# Patient Record
Sex: FEMALE | Race: WHITE | Employment: OTHER | URBAN - METROPOLITAN AREA
[De-identification: names, ages, dates, MRNs, and addresses within clinical notes are randomized per-mention and may not be internally consistent; named-entity substitution may affect disease eponyms.]

---

## 2019-03-04 ENCOUNTER — PREP FOR PROCEDURE (OUTPATIENT)
Dept: SURGERY | Age: 74
End: 2019-03-04

## 2019-03-06 RX ORDER — ALBUTEROL SULFATE 90 UG/1
2 AEROSOL, METERED RESPIRATORY (INHALATION) EVERY 4 HOURS PRN
COMMUNITY
End: 2020-09-30

## 2019-03-08 ENCOUNTER — ANESTHESIA EVENT (OUTPATIENT)
Dept: ENDOSCOPY | Age: 74
End: 2019-03-08
Payer: MEDICARE

## 2019-03-08 ENCOUNTER — ANESTHESIA (OUTPATIENT)
Dept: ENDOSCOPY | Age: 74
End: 2019-03-08
Payer: MEDICARE

## 2019-03-08 ENCOUNTER — HOSPITAL ENCOUNTER (OUTPATIENT)
Age: 74
Setting detail: OUTPATIENT SURGERY
Discharge: HOME | End: 2019-03-08
Attending: SURGERY | Admitting: SURGERY
Payer: MEDICARE

## 2019-03-08 VITALS
DIASTOLIC BLOOD PRESSURE: 50 MMHG | RESPIRATION RATE: 16 BRPM | SYSTOLIC BLOOD PRESSURE: 86 MMHG | OXYGEN SATURATION: 98 %

## 2019-03-08 VITALS
WEIGHT: 177 LBS | RESPIRATION RATE: 16 BRPM | TEMPERATURE: 99.2 F | DIASTOLIC BLOOD PRESSURE: 71 MMHG | BODY MASS INDEX: 30.22 KG/M2 | HEART RATE: 64 BPM | OXYGEN SATURATION: 98 % | HEIGHT: 64 IN | SYSTOLIC BLOOD PRESSURE: 150 MMHG

## 2019-03-08 PROCEDURE — 2580000003 HC RX 258: Performed by: SURGERY

## 2019-03-08 PROCEDURE — 3700000001 HC ADD 15 MINUTES (ANESTHESIA): Performed by: SURGERY

## 2019-03-08 PROCEDURE — 6360000002 HC RX W HCPCS

## 2019-03-08 PROCEDURE — 88342 IMHCHEM/IMCYTCHM 1ST ANTB: CPT

## 2019-03-08 PROCEDURE — 7100000011 HC PHASE II RECOVERY - ADDTL 15 MIN: Performed by: SURGERY

## 2019-03-08 PROCEDURE — 7100000010 HC PHASE II RECOVERY - FIRST 15 MIN: Performed by: SURGERY

## 2019-03-08 PROCEDURE — 2709999900 HC NON-CHARGEABLE SUPPLY: Performed by: SURGERY

## 2019-03-08 PROCEDURE — 3700000000 HC ANESTHESIA ATTENDED CARE: Performed by: SURGERY

## 2019-03-08 PROCEDURE — 2580000003 HC RX 258

## 2019-03-08 PROCEDURE — 3609012400 HC EGD TRANSORAL BIOPSY SINGLE/MULTIPLE: Performed by: SURGERY

## 2019-03-08 PROCEDURE — 88305 TISSUE EXAM BY PATHOLOGIST: CPT

## 2019-03-08 PROCEDURE — 3609009500 HC COLONOSCOPY DIAGNOSTIC OR SCREENING: Performed by: SURGERY

## 2019-03-08 RX ORDER — 0.9 % SODIUM CHLORIDE 0.9 %
10 VIAL (ML) INJECTION EVERY 12 HOURS SCHEDULED
Status: DISCONTINUED | OUTPATIENT
Start: 2019-03-08 | End: 2019-03-08 | Stop reason: HOSPADM

## 2019-03-08 RX ORDER — 0.9 % SODIUM CHLORIDE 0.9 %
10 VIAL (ML) INJECTION PRN
Status: DISCONTINUED | OUTPATIENT
Start: 2019-03-08 | End: 2019-03-08 | Stop reason: HOSPADM

## 2019-03-08 RX ORDER — SODIUM CHLORIDE 9 MG/ML
INJECTION, SOLUTION INTRAVENOUS CONTINUOUS PRN
Status: DISCONTINUED | OUTPATIENT
Start: 2019-03-08 | End: 2019-03-08 | Stop reason: SDUPTHER

## 2019-03-08 RX ORDER — PROPOFOL 10 MG/ML
INJECTION, EMULSION INTRAVENOUS PRN
Status: DISCONTINUED | OUTPATIENT
Start: 2019-03-08 | End: 2019-03-08 | Stop reason: SDUPTHER

## 2019-03-08 RX ADMIN — Medication 10 ML: at 11:21

## 2019-03-08 RX ADMIN — SODIUM CHLORIDE: 9 INJECTION, SOLUTION INTRAVENOUS at 12:10

## 2019-03-08 RX ADMIN — PROPOFOL 290 MG: 10 INJECTION, EMULSION INTRAVENOUS at 12:12

## 2019-03-08 ASSESSMENT — PAIN SCALES - GENERAL
PAINLEVEL_OUTOF10: 0

## 2019-03-08 ASSESSMENT — PAIN - FUNCTIONAL ASSESSMENT: PAIN_FUNCTIONAL_ASSESSMENT: 0-10

## 2019-04-25 ENCOUNTER — HOSPITAL ENCOUNTER (OUTPATIENT)
Dept: GENERAL RADIOLOGY | Age: 74
Discharge: HOME | End: 2019-04-27
Payer: MEDICARE

## 2019-04-25 DIAGNOSIS — Z12.39 BREAST CANCER SCREENING: ICD-10-CM

## 2019-04-25 PROCEDURE — 77063 BREAST TOMOSYNTHESIS BI: CPT

## 2019-05-21 ENCOUNTER — OFFICE VISIT (OUTPATIENT)
Dept: PRIMARY CARE CLINIC | Age: 74
End: 2019-05-21
Payer: MEDICARE

## 2019-05-21 VITALS
DIASTOLIC BLOOD PRESSURE: 82 MMHG | BODY MASS INDEX: 30.22 KG/M2 | HEART RATE: 77 BPM | HEIGHT: 64 IN | WEIGHT: 177 LBS | OXYGEN SATURATION: 95 % | TEMPERATURE: 98.9 F | SYSTOLIC BLOOD PRESSURE: 120 MMHG

## 2019-05-21 DIAGNOSIS — M79.642 HAND PAIN, LEFT: ICD-10-CM

## 2019-05-21 DIAGNOSIS — E78.2 MIXED HYPERLIPIDEMIA: ICD-10-CM

## 2019-05-21 DIAGNOSIS — C50.412 MALIGNANT NEOPLASM OF UPPER-OUTER QUADRANT OF LEFT BREAST IN FEMALE, ESTROGEN RECEPTOR NEGATIVE (HCC): ICD-10-CM

## 2019-05-21 DIAGNOSIS — Z17.1 MALIGNANT NEOPLASM OF UPPER-OUTER QUADRANT OF LEFT BREAST IN FEMALE, ESTROGEN RECEPTOR NEGATIVE (HCC): ICD-10-CM

## 2019-05-21 DIAGNOSIS — K21.9 GASTROESOPHAGEAL REFLUX DISEASE WITHOUT ESOPHAGITIS: ICD-10-CM

## 2019-05-21 DIAGNOSIS — D50.9 IRON DEFICIENCY ANEMIA, UNSPECIFIED IRON DEFICIENCY ANEMIA TYPE: Primary | ICD-10-CM

## 2019-05-21 PROCEDURE — 99214 OFFICE O/P EST MOD 30 MIN: CPT | Performed by: FAMILY MEDICINE

## 2019-05-21 RX ORDER — FAMOTIDINE 40 MG/1
40 TABLET, FILM COATED ORAL DAILY
Qty: 90 TABLET | Refills: 2 | Status: ON HOLD | OUTPATIENT
Start: 2019-05-21 | End: 2019-07-04 | Stop reason: HOSPADM

## 2019-05-21 ASSESSMENT — PATIENT HEALTH QUESTIONNAIRE - PHQ9
9. THOUGHTS THAT YOU WOULD BE BETTER OFF DEAD, OR OF HURTING YOURSELF: 0
2. FEELING DOWN, DEPRESSED OR HOPELESS: 2
8. MOVING OR SPEAKING SO SLOWLY THAT OTHER PEOPLE COULD HAVE NOTICED. OR THE OPPOSITE, BEING SO FIGETY OR RESTLESS THAT YOU HAVE BEEN MOVING AROUND A LOT MORE THAN USUAL: 0
SUM OF ALL RESPONSES TO PHQ9 QUESTIONS 1 & 2: 4
4. FEELING TIRED OR HAVING LITTLE ENERGY: 2
10. IF YOU CHECKED OFF ANY PROBLEMS, HOW DIFFICULT HAVE THESE PROBLEMS MADE IT FOR YOU TO DO YOUR WORK, TAKE CARE OF THINGS AT HOME, OR GET ALONG WITH OTHER PEOPLE: 0
SUM OF ALL RESPONSES TO PHQ QUESTIONS 1-9: 7
3. TROUBLE FALLING OR STAYING ASLEEP: 0
6. FEELING BAD ABOUT YOURSELF - OR THAT YOU ARE A FAILURE OR HAVE LET YOURSELF OR YOUR FAMILY DOWN: 0
1. LITTLE INTEREST OR PLEASURE IN DOING THINGS: 2
5. POOR APPETITE OR OVEREATING: 1
SUM OF ALL RESPONSES TO PHQ QUESTIONS 1-9: 7
7. TROUBLE CONCENTRATING ON THINGS, SUCH AS READING THE NEWSPAPER OR WATCHING TELEVISION: 0

## 2019-05-21 ASSESSMENT — ENCOUNTER SYMPTOMS
EYES NEGATIVE: 1
ALLERGIC/IMMUNOLOGIC NEGATIVE: 1
GASTROINTESTINAL NEGATIVE: 1
RESPIRATORY NEGATIVE: 1

## 2019-05-21 NOTE — PROGRESS NOTES
19     Toney Simmonds    : 1945 Sex: female   Age: 68 y.o. Chief Complaint   Patient presents with    Anxiety    Gastroesophageal Reflux    Anemia     getting Iron infusions    Arm Pain     left arm and into shoulder area at times       Prior to Admission medications    Medication Sig Start Date End Date Taking? Authorizing Provider   famotidine (PEPCID) 40 MG tablet Take 1 tablet by mouth daily 19  Yes Manoj Aguilar,    albuterol sulfate HFA (PROAIR HFA) 108 (90 Base) MCG/ACT inhaler Inhale 2 puffs into the lungs every 4 hours as needed for Wheezing   Yes Historical Provider, MD   Glucosamine-Chondroitin (GLUCOSAMINE CHONDR COMPLEX PO) Take 2,000 mg by mouth daily. Yes Historical Provider, MD   Omega-3 Fatty Acids (FISH OIL) 1000 MG CAPS Take 520 mg by mouth daily    Yes Historical Provider, MD   therapeutic multivitamin-minerals (THERAGRAN-M) tablet Take 1 tablet by mouth daily. Yes Historical Provider, MD   Calcium Carbonate-Vitamin D (CALCIUM + D) 600-200 MG-UNIT TABS Take  by mouth 2 times daily. Yes Historical Provider, MD          HPI: Patient seen today in medical follow-up reflux disease stable when necessary use of famotidine. Malignant neoplasm of the left breast back in  stable. T1 N0 stable disease. Chronic anemia follows with oncology for IV iron replacement and stable. Hyperlipidemia we'll follow-up on labs currently diet and exercise. Left hand pain acute in onset no significant trauma. Mild sprain at home. Will check x-ray study. Review of Systems   Constitutional: Negative. HENT: Negative. Eyes: Negative. Respiratory: Negative. Gastrointestinal: Negative. Endocrine: Negative. Genitourinary: Negative. Musculoskeletal: Negative. Left hand pain mild strain and sprain. Minimal swelling   Skin: Negative. Allergic/Immunologic: Negative. Neurological: Negative. Hematological: Negative.     Psychiatric/Behavioral: Negative. Current Outpatient Medications:     famotidine (PEPCID) 40 MG tablet, Take 1 tablet by mouth daily, Disp: 90 tablet, Rfl: 2    albuterol sulfate HFA (PROAIR HFA) 108 (90 Base) MCG/ACT inhaler, Inhale 2 puffs into the lungs every 4 hours as needed for Wheezing, Disp: , Rfl:     Glucosamine-Chondroitin (GLUCOSAMINE CHONDR COMPLEX PO), Take 2,000 mg by mouth daily. , Disp: , Rfl:     Omega-3 Fatty Acids (FISH OIL) 1000 MG CAPS, Take 520 mg by mouth daily , Disp: , Rfl:     therapeutic multivitamin-minerals (THERAGRAN-M) tablet, Take 1 tablet by mouth daily. , Disp: , Rfl:     Calcium Carbonate-Vitamin D (CALCIUM + D) 600-200 MG-UNIT TABS, Take  by mouth 2 times daily. , Disp: , Rfl:     No Known Allergies    Social History     Tobacco Use    Smoking status: Former Smoker     Packs/day: 1.00     Years: 10.00     Pack years: 10.00     Last attempt to quit: 2002     Years since quittin.3    Smokeless tobacco: Never Used   Substance Use Topics    Alcohol use: Not on file    Drug use: No      Past Surgical History:   Procedure Laterality Date   Lori Lands LUMPECTOMY      Dr. Matthews [facility]    COLONOSCOPY N/A 3/8/2019    COLONOSCOPY DIAGNOSTIC performed by Bj Roe MD at [address] [facility]      Dr. Barrios Meagan N/A 3/8/2019    EGD BIOPSY performed by Bj Roe MD at [facility] History   Problem Relation Age of Onset    Cancer Mother         Pancreatic and liver    Diabetes Father     Stroke Father     Lupus Sister     Breast Cancer Sister      Past Medical History:   Diagnosis Date    Anemia     Anemia     Asthma     COPD [facility]chronic obstructive pulmonary disease) ([facility] [address]     Gastroesophageal reflux     [place] (hard of hearing)     HX: breast cancer     Right breast    Iron deficiency Vitals:    05/21/19 1418   BP: 120/82   Pulse: 77   Temp: 98.9 °F (37.2 °C)   TempSrc: Temporal   SpO2: 95%   Weight: 177 lb (80.3 kg)   Height: 5' 4\" (1.626 m)     BP Readings from Last 3 Encounters:   05/21/19 120/82   03/08/19 (!) 150/71   03/08/19 (!) 86/50        Physical Exam   Constitutional: She is oriented to person, place, and time. She appears well-developed and well-nourished. HENT:   Head: Normocephalic. Right Ear: External ear normal.   Left Ear: External ear normal.   Nose: Nose normal.   Mouth/Throat: Oropharynx is clear and moist.   Eyes: Pupils are equal, round, and reactive to light. Conjunctivae and EOM are normal.   Neck: Normal range of motion. Neck supple. Cardiovascular: Normal rate and intact distal pulses. Pulmonary/Chest: Breath sounds normal.   Abdominal: Soft. Bowel sounds are normal.   Musculoskeletal: Normal range of motion. Strain sprain left hand. Minimal swelling over the index finger. X-ray to be completed. Recommended rest ice and ibuprofen when necessary   Neurological: She is alert and oriented to person, place, and time. Skin: Skin is warm and dry. Psychiatric: She has a normal mood and affect. Her behavior is normal.   Nursing note and vitals reviewed. exam findings essentially stable as noted. X-ray today. Labs with next visit. Three-month follow-up. Plan Per Assessment:  Shirin Ramirez was seen today for anxiety, gastroesophageal reflux, anemia and arm pain. Diagnoses and all orders for this visit:    Iron deficiency anemia, unspecified iron deficiency anemia type    Gastroesophageal reflux disease without esophagitis    Malignant neoplasm of upper-outer quadrant of left breast in female, estrogen receptor negative (HCC)    Hand pain, left  -     XR HAND LEFT (2 VIEWS); Future    Other orders  -     famotidine (PEPCID) 40 MG tablet; Take 1 tablet by mouth daily            No follow-ups on file.       Melvin Grissom,     Note was generated with the assistance of voice recognition software. Document was reviewed however may contain grammatical errors.

## 2019-05-23 ENCOUNTER — TELEPHONE (OUTPATIENT)
Dept: PODIATRY | Age: 74
End: 2019-05-23

## 2019-07-02 ENCOUNTER — APPOINTMENT (OUTPATIENT)
Dept: GENERAL RADIOLOGY | Age: 74
DRG: 378 | End: 2019-07-02
Payer: MEDICARE

## 2019-07-02 ENCOUNTER — HOSPITAL ENCOUNTER (INPATIENT)
Age: 74
LOS: 2 days | Discharge: HOME | DRG: 378 | End: 2019-07-04
Attending: EMERGENCY MEDICINE | Admitting: INTERNAL MEDICINE
Payer: MEDICARE

## 2019-07-02 DIAGNOSIS — Z85.3 HISTORY OF BREAST CANCER: ICD-10-CM

## 2019-07-02 DIAGNOSIS — K92.2 UGIB (UPPER GASTROINTESTINAL BLEED): Primary | ICD-10-CM

## 2019-07-02 DIAGNOSIS — D62 ACUTE BLOOD LOSS ANEMIA: ICD-10-CM

## 2019-07-02 PROBLEM — M79.642 HAND PAIN, LEFT: Status: RESOLVED | Noted: 2019-05-21 | Resolved: 2019-07-02

## 2019-07-02 LAB
ABO/RH: NORMAL
ALBUMIN SERPL-MCNC: 4.1 G/DL (ref 3.5–5.2)
ALP BLD-CCNC: 62 U/L (ref 35–104)
ALT SERPL-CCNC: 8 U/L (ref 0–32)
ANION GAP SERPL CALCULATED.3IONS-SCNC: 8 MMOL/L (ref 7–16)
ANISOCYTOSIS: ABNORMAL
ANTIBODY SCREEN: NORMAL
AST SERPL-CCNC: 11 U/L (ref 0–31)
BASOPHILS ABSOLUTE: 0.01 E9/L (ref 0–0.2)
BASOPHILS RELATIVE PERCENT: 0.3 % (ref 0–2)
BILIRUB SERPL-MCNC: 0.2 MG/DL (ref 0–1.2)
BUN BLDV-MCNC: 11 MG/DL (ref 8–23)
CALCIUM SERPL-MCNC: 8.9 MG/DL (ref 8.6–10.2)
CHLORIDE BLD-SCNC: 103 MMOL/L (ref 98–107)
CO2: 28 MMOL/L (ref 22–29)
CREAT SERPL-MCNC: 0.7 MG/DL (ref 0.5–1)
EOSINOPHILS ABSOLUTE: 0.02 E9/L (ref 0.05–0.5)
EOSINOPHILS RELATIVE PERCENT: 0.6 % (ref 0–6)
GFR AFRICAN AMERICAN: >60
GFR NON-AFRICAN AMERICAN: >60 ML/MIN/1.73
GLUCOSE BLD-MCNC: 97 MG/DL (ref 74–99)
HCT VFR BLD CALC: 19.9 % (ref 34–48)
HEMOGLOBIN: 5.7 G/DL (ref 11.5–15.5)
HYPOCHROMIA: ABNORMAL
IMMATURE GRANULOCYTES #: 0.02 E9/L
IMMATURE GRANULOCYTES %: 0.6 % (ref 0–5)
LYMPHOCYTES ABSOLUTE: 0.6 E9/L (ref 1.5–4)
LYMPHOCYTES RELATIVE PERCENT: 18.1 % (ref 20–42)
MCH RBC QN AUTO: 22.3 PG (ref 26–35)
MCHC RBC AUTO-ENTMCNC: 28.6 % (ref 32–34.5)
MCV RBC AUTO: 77.7 FL (ref 80–99.9)
MONOCYTES ABSOLUTE: 0.34 E9/L (ref 0.1–0.95)
MONOCYTES RELATIVE PERCENT: 10.2 % (ref 2–12)
NEUTROPHILS ABSOLUTE: 2.33 E9/L (ref 1.8–7.3)
NEUTROPHILS RELATIVE PERCENT: 70.2 % (ref 43–80)
OVALOCYTES: ABNORMAL
PDW BLD-RTO: 15.4 FL (ref 11.5–15)
PLATELET # BLD: 287 E9/L (ref 130–450)
PMV BLD AUTO: 9.3 FL (ref 7–12)
POIKILOCYTES: ABNORMAL
POLYCHROMASIA: ABNORMAL
POTASSIUM SERPL-SCNC: 4 MMOL/L (ref 3.5–5)
RBC # BLD: 2.56 E12/L (ref 3.5–5.5)
SCHISTOCYTES: ABNORMAL
SODIUM BLD-SCNC: 139 MMOL/L (ref 132–146)
TOTAL PROTEIN: 6.5 G/DL (ref 6.4–8.3)
WBC # BLD: 3.3 E9/L (ref 4.5–11.5)

## 2019-07-02 PROCEDURE — 86900 BLOOD TYPING SEROLOGIC ABO: CPT

## 2019-07-02 PROCEDURE — C9113 INJ PANTOPRAZOLE SODIUM, VIA: HCPCS | Performed by: EMERGENCY MEDICINE

## 2019-07-02 PROCEDURE — 80053 COMPREHEN METABOLIC PANEL: CPT

## 2019-07-02 PROCEDURE — 99285 EMERGENCY DEPT VISIT HI MDM: CPT

## 2019-07-02 PROCEDURE — 71045 X-RAY EXAM CHEST 1 VIEW: CPT

## 2019-07-02 PROCEDURE — 6360000002 HC RX W HCPCS: Performed by: EMERGENCY MEDICINE

## 2019-07-02 PROCEDURE — 96374 THER/PROPH/DIAG INJ IV PUSH: CPT

## 2019-07-02 PROCEDURE — 2060000000 HC ICU INTERMEDIATE R&B

## 2019-07-02 PROCEDURE — 85025 COMPLETE CBC W/AUTO DIFF WBC: CPT

## 2019-07-02 PROCEDURE — 93005 ELECTROCARDIOGRAM TRACING: CPT | Performed by: EMERGENCY MEDICINE

## 2019-07-02 PROCEDURE — 96375 TX/PRO/DX INJ NEW DRUG ADDON: CPT

## 2019-07-02 PROCEDURE — 86923 COMPATIBILITY TEST ELECTRIC: CPT

## 2019-07-02 PROCEDURE — 86850 RBC ANTIBODY SCREEN: CPT

## 2019-07-02 PROCEDURE — G0378 HOSPITAL OBSERVATION PER HR: HCPCS

## 2019-07-02 PROCEDURE — 36415 COLL VENOUS BLD VENIPUNCTURE: CPT

## 2019-07-02 PROCEDURE — 86901 BLOOD TYPING SEROLOGIC RH(D): CPT

## 2019-07-02 PROCEDURE — P9016 RBC LEUKOCYTES REDUCED: HCPCS

## 2019-07-02 PROCEDURE — 2580000003 HC RX 258: Performed by: INTERNAL MEDICINE

## 2019-07-02 PROCEDURE — 36430 TRANSFUSION BLD/BLD COMPNT: CPT

## 2019-07-02 RX ORDER — SODIUM CHLORIDE 0.9 % (FLUSH) 0.9 %
10 SYRINGE (ML) INJECTION PRN
Status: DISCONTINUED | OUTPATIENT
Start: 2019-07-02 | End: 2019-07-02 | Stop reason: SDUPTHER

## 2019-07-02 RX ORDER — 0.9 % SODIUM CHLORIDE 0.9 %
10 VIAL (ML) INJECTION ONCE
Status: COMPLETED | OUTPATIENT
Start: 2019-07-02 | End: 2019-07-02

## 2019-07-02 RX ORDER — ACETAMINOPHEN 325 MG/1
650 TABLET ORAL EVERY 4 HOURS PRN
Status: DISCONTINUED | OUTPATIENT
Start: 2019-07-02 | End: 2019-07-04 | Stop reason: HOSPADM

## 2019-07-02 RX ORDER — ONDANSETRON 2 MG/ML
4 INJECTION INTRAMUSCULAR; INTRAVENOUS EVERY 6 HOURS PRN
Status: DISCONTINUED | OUTPATIENT
Start: 2019-07-02 | End: 2019-07-04 | Stop reason: HOSPADM

## 2019-07-02 RX ORDER — ALBUTEROL SULFATE 90 UG/1
2 AEROSOL, METERED RESPIRATORY (INHALATION) EVERY 4 HOURS PRN
Status: DISCONTINUED | OUTPATIENT
Start: 2019-07-02 | End: 2019-07-04 | Stop reason: HOSPADM

## 2019-07-02 RX ORDER — SODIUM CHLORIDE 9 MG/ML
INJECTION, SOLUTION INTRAVENOUS CONTINUOUS
Status: ACTIVE | OUTPATIENT
Start: 2019-07-02 | End: 2019-07-03

## 2019-07-02 RX ORDER — SODIUM CHLORIDE 0.9 % (FLUSH) 0.9 %
10 SYRINGE (ML) INJECTION EVERY 12 HOURS SCHEDULED
Status: DISCONTINUED | OUTPATIENT
Start: 2019-07-02 | End: 2019-07-04 | Stop reason: HOSPADM

## 2019-07-02 RX ORDER — SODIUM CHLORIDE 0.9 % (FLUSH) 0.9 %
10 SYRINGE (ML) INJECTION PRN
Status: DISCONTINUED | OUTPATIENT
Start: 2019-07-02 | End: 2019-07-04 | Stop reason: HOSPADM

## 2019-07-02 RX ORDER — 0.9 % SODIUM CHLORIDE 0.9 %
250 INTRAVENOUS SOLUTION INTRAVENOUS ONCE
Status: COMPLETED | OUTPATIENT
Start: 2019-07-02 | End: 2019-07-03

## 2019-07-02 RX ORDER — PANTOPRAZOLE SODIUM 40 MG/10ML
40 INJECTION, POWDER, LYOPHILIZED, FOR SOLUTION INTRAVENOUS DAILY
Status: DISCONTINUED | OUTPATIENT
Start: 2019-07-02 | End: 2019-07-04

## 2019-07-02 RX ORDER — PANTOPRAZOLE SODIUM 40 MG/10ML
40 INJECTION, POWDER, LYOPHILIZED, FOR SOLUTION INTRAVENOUS ONCE
Status: COMPLETED | OUTPATIENT
Start: 2019-07-02 | End: 2019-07-02

## 2019-07-02 RX ADMIN — SODIUM CHLORIDE 250 ML: 9 INJECTION, SOLUTION INTRAVENOUS at 18:15

## 2019-07-02 RX ADMIN — SODIUM CHLORIDE 10 ML: 9 INJECTION INTRAMUSCULAR; INTRAVENOUS; SUBCUTANEOUS at 18:15

## 2019-07-02 RX ADMIN — PANTOPRAZOLE SODIUM 40 MG: 40 INJECTION, POWDER, FOR SOLUTION INTRAVENOUS at 18:30

## 2019-07-02 ASSESSMENT — ENCOUNTER SYMPTOMS
BACK PAIN: 0
DIARRHEA: 0
CONSTIPATION: 0
HEMOPTYSIS: 0
ABDOMINAL PAIN: 0
SPUTUM PRODUCTION: 0
VOMITING: 0
COUGH: 0
WHEEZING: 0
NAUSEA: 0
BLOOD IN STOOL: 0
SHORTNESS OF BREATH: 1

## 2019-07-02 ASSESSMENT — PAIN SCALES - GENERAL
PAINLEVEL_OUTOF10: 0
PAINLEVEL_OUTOF10: 0

## 2019-07-02 NOTE — ED PROVIDER NOTES
murmur heard. Pulmonary/Chest: Effort normal and breath sounds normal. No respiratory distress. She has no wheezes. She has no rales. Abdominal: Soft. Bowel sounds are normal. There is no tenderness. There is no rebound and no guarding. Genitourinary: Rectal exam shows guaiac positive stool. Musculoskeletal: She exhibits no edema. Neurological: She is alert. Skin: Skin is warm and dry. Capillary refill takes less than 2 seconds. She is not diaphoretic. Nursing note and vitals reviewed. Procedures       --------------------------------------------- PAST HISTORY ---------------------------------------------  Past Medical History:  has a past medical history of Anemia, Anemia, Asthma, COPD (chronic obstructive pulmonary disease) ([facility] [address] Gastroesophageal reflux, [place] (hard of hearing), HX: breast cancer, and Iron deficiency. Past Surgical History:  has a past surgical history that includes lymph node dissection (2010); Breast lumpectomy (2010); back surgery (1989); Hysterectomy; Colonoscopy (N/A, 3/8/2019); and Upper gastrointestinal endoscopy (N/A, 3/8/2019). Social History:  reports that she quit smoking about 17 years ago. She has a 10.00 pack-year smoking history. She has never used smokeless tobacco. She reports that she does not use drugs. Family History: family history includes Breast Cancer in her sister; Cancer in her mother; Diabetes in her father; Lupus in her sister; Stroke in her father. The patients home medications have been reviewed. Allergies: Patient has no known allergies.     -------------------------------------------------- RESULTS -------------------------------------------------    LABS:  Results for orders placed or performed during the hospital encounter of 07/02/19   CBC Auto Differential   Result Value Ref Range    WBC 3.3 (L) 4.5 - 11.5 E9/L    RBC 2.56 (L) 3.50 - 5.50 E12/L    Hemoglobin 5.7 (LL) 11.5 - 15.5 g/dL    Hematocrit 19.9 (L) 34.0 - 48.0 %    MCV

## 2019-07-03 ENCOUNTER — ANESTHESIA EVENT (OUTPATIENT)
Dept: ENDOSCOPY | Age: 74
DRG: 378 | End: 2019-07-03
Payer: MEDICARE

## 2019-07-03 ENCOUNTER — ANESTHESIA (OUTPATIENT)
Dept: ENDOSCOPY | Age: 74
DRG: 378 | End: 2019-07-03
Payer: MEDICARE

## 2019-07-03 VITALS
DIASTOLIC BLOOD PRESSURE: 65 MMHG | RESPIRATION RATE: 8 BRPM | OXYGEN SATURATION: 97 % | SYSTOLIC BLOOD PRESSURE: 144 MMHG

## 2019-07-03 LAB
ALBUMIN SERPL-MCNC: 3.5 G/DL (ref 3.5–5.2)
ALP BLD-CCNC: 62 U/L (ref 35–104)
ALT SERPL-CCNC: 9 U/L (ref 0–32)
ANION GAP SERPL CALCULATED.3IONS-SCNC: 12 MMOL/L (ref 7–16)
APTT: 26.6 SEC (ref 24.5–35.1)
AST SERPL-CCNC: 12 U/L (ref 0–31)
BASOPHILS ABSOLUTE: 0.01 E9/L (ref 0–0.2)
BASOPHILS RELATIVE PERCENT: 0.3 % (ref 0–2)
BILIRUB SERPL-MCNC: 0.4 MG/DL (ref 0–1.2)
BLOOD BANK DISPENSE STATUS: NORMAL
BLOOD BANK PRODUCT CODE: NORMAL
BPU ID: NORMAL
BUN BLDV-MCNC: 7 MG/DL (ref 8–23)
CALCIUM SERPL-MCNC: 8.8 MG/DL (ref 8.6–10.2)
CHLORIDE BLD-SCNC: 107 MMOL/L (ref 98–107)
CO2: 23 MMOL/L (ref 22–29)
CREAT SERPL-MCNC: 0.8 MG/DL (ref 0.5–1)
DESCRIPTION BLOOD BANK: NORMAL
EKG ATRIAL RATE: 80 BPM
EKG P AXIS: 22 DEGREES
EKG P-R INTERVAL: 188 MS
EKG Q-T INTERVAL: 368 MS
EKG QRS DURATION: 84 MS
EKG QTC CALCULATION (BAZETT): 424 MS
EKG R AXIS: 3 DEGREES
EKG T AXIS: 12 DEGREES
EKG VENTRICULAR RATE: 80 BPM
EOSINOPHILS ABSOLUTE: 0.07 E9/L (ref 0.05–0.5)
EOSINOPHILS RELATIVE PERCENT: 2 % (ref 0–6)
GFR AFRICAN AMERICAN: >60
GFR NON-AFRICAN AMERICAN: >60 ML/MIN/1.73
GLUCOSE BLD-MCNC: 94 MG/DL (ref 74–99)
HCT VFR BLD CALC: 23.2 % (ref 34–48)
HCT VFR BLD CALC: 24.3 % (ref 34–48)
HCT VFR BLD CALC: 24.9 % (ref 34–48)
HCT VFR BLD CALC: 26.8 % (ref 34–48)
HEMOGLOBIN: 6.8 G/DL (ref 11.5–15.5)
HEMOGLOBIN: 7.2 G/DL (ref 11.5–15.5)
HEMOGLOBIN: 7.3 G/DL (ref 11.5–15.5)
HEMOGLOBIN: 8.2 G/DL (ref 11.5–15.5)
IMMATURE GRANULOCYTES #: 0.04 E9/L
IMMATURE GRANULOCYTES %: 1.2 % (ref 0–5)
INR BLD: 1
LYMPHOCYTES ABSOLUTE: 0.82 E9/L (ref 1.5–4)
LYMPHOCYTES RELATIVE PERCENT: 23.8 % (ref 20–42)
MAGNESIUM: 2 MG/DL (ref 1.6–2.6)
MCH RBC QN AUTO: 23.1 PG (ref 26–35)
MCHC RBC AUTO-ENTMCNC: 29.3 % (ref 32–34.5)
MCV RBC AUTO: 78.6 FL (ref 80–99.9)
MONOCYTES ABSOLUTE: 0.4 E9/L (ref 0.1–0.95)
MONOCYTES RELATIVE PERCENT: 11.6 % (ref 2–12)
NEUTROPHILS ABSOLUTE: 2.11 E9/L (ref 1.8–7.3)
NEUTROPHILS RELATIVE PERCENT: 61.1 % (ref 43–80)
PDW BLD-RTO: 15.5 FL (ref 11.5–15)
PLATELET # BLD: 252 E9/L (ref 130–450)
PMV BLD AUTO: 9 FL (ref 7–12)
POTASSIUM SERPL-SCNC: 3.9 MMOL/L (ref 3.5–5)
PROTHROMBIN TIME: 12 SEC (ref 9.3–12.4)
RBC # BLD: 2.95 E12/L (ref 3.5–5.5)
SODIUM BLD-SCNC: 142 MMOL/L (ref 132–146)
TOTAL PROTEIN: 5.5 G/DL (ref 6.4–8.3)
TROPONIN: <0.01 NG/ML (ref 0–0.03)
WBC # BLD: 3.5 E9/L (ref 4.5–11.5)

## 2019-07-03 PROCEDURE — 85014 HEMATOCRIT: CPT

## 2019-07-03 PROCEDURE — 7100000001 HC PACU RECOVERY - ADDTL 15 MIN: Performed by: SURGERY

## 2019-07-03 PROCEDURE — 93010 ELECTROCARDIOGRAM REPORT: CPT | Performed by: INTERNAL MEDICINE

## 2019-07-03 PROCEDURE — P9016 RBC LEUKOCYTES REDUCED: HCPCS

## 2019-07-03 PROCEDURE — 0DJ08ZZ INSPECTION OF UPPER INTESTINAL TRACT, VIA NATURAL OR ARTIFICIAL OPENING ENDOSCOPIC: ICD-10-PCS | Performed by: HOSPITALIST

## 2019-07-03 PROCEDURE — 83735 ASSAY OF MAGNESIUM: CPT

## 2019-07-03 PROCEDURE — 6370000000 HC RX 637 (ALT 250 FOR IP): Performed by: SURGERY

## 2019-07-03 PROCEDURE — 85610 PROTHROMBIN TIME: CPT

## 2019-07-03 PROCEDURE — G0378 HOSPITAL OBSERVATION PER HR: HCPCS

## 2019-07-03 PROCEDURE — 2580000003 HC RX 258: Performed by: NURSE ANESTHETIST, CERTIFIED REGISTERED

## 2019-07-03 PROCEDURE — 85025 COMPLETE CBC W/AUTO DIFF WBC: CPT

## 2019-07-03 PROCEDURE — 7100000000 HC PACU RECOVERY - FIRST 15 MIN: Performed by: SURGERY

## 2019-07-03 PROCEDURE — 2580000003 HC RX 258: Performed by: INTERNAL MEDICINE

## 2019-07-03 PROCEDURE — 2060000000 HC ICU INTERMEDIATE R&B

## 2019-07-03 PROCEDURE — 36415 COLL VENOUS BLD VENIPUNCTURE: CPT

## 2019-07-03 PROCEDURE — 6360000002 HC RX W HCPCS: Performed by: INTERNAL MEDICINE

## 2019-07-03 PROCEDURE — 85730 THROMBOPLASTIN TIME PARTIAL: CPT

## 2019-07-03 PROCEDURE — 6360000002 HC RX W HCPCS: Performed by: NURSE ANESTHETIST, CERTIFIED REGISTERED

## 2019-07-03 PROCEDURE — 36430 TRANSFUSION BLD/BLD COMPNT: CPT

## 2019-07-03 PROCEDURE — 3700000000 HC ANESTHESIA ATTENDED CARE: Performed by: SURGERY

## 2019-07-03 PROCEDURE — C9113 INJ PANTOPRAZOLE SODIUM, VIA: HCPCS | Performed by: INTERNAL MEDICINE

## 2019-07-03 PROCEDURE — 3609017100 HC EGD: Performed by: SURGERY

## 2019-07-03 PROCEDURE — 2580000003 HC RX 258: Performed by: SURGERY

## 2019-07-03 PROCEDURE — 80053 COMPREHEN METABOLIC PANEL: CPT

## 2019-07-03 PROCEDURE — 96376 TX/PRO/DX INJ SAME DRUG ADON: CPT

## 2019-07-03 PROCEDURE — 86923 COMPATIBILITY TEST ELECTRIC: CPT

## 2019-07-03 PROCEDURE — 2709999900 HC NON-CHARGEABLE SUPPLY: Performed by: SURGERY

## 2019-07-03 PROCEDURE — 84484 ASSAY OF TROPONIN QUANT: CPT

## 2019-07-03 PROCEDURE — 85018 HEMOGLOBIN: CPT

## 2019-07-03 PROCEDURE — 3700000001 HC ADD 15 MINUTES (ANESTHESIA): Performed by: SURGERY

## 2019-07-03 RX ORDER — PROPOFOL 10 MG/ML
INJECTION, EMULSION INTRAVENOUS PRN
Status: DISCONTINUED | OUTPATIENT
Start: 2019-07-03 | End: 2019-07-03 | Stop reason: SDUPTHER

## 2019-07-03 RX ORDER — 0.9 % SODIUM CHLORIDE 0.9 %
250 INTRAVENOUS SOLUTION INTRAVENOUS ONCE
Status: DISCONTINUED | OUTPATIENT
Start: 2019-07-03 | End: 2019-07-04 | Stop reason: HOSPADM

## 2019-07-03 RX ORDER — SODIUM CHLORIDE 9 MG/ML
INJECTION, SOLUTION INTRAVENOUS CONTINUOUS PRN
Status: DISCONTINUED | OUTPATIENT
Start: 2019-07-03 | End: 2019-07-03 | Stop reason: SDUPTHER

## 2019-07-03 RX ADMIN — SODIUM CHLORIDE: 9 INJECTION, SOLUTION INTRAVENOUS at 09:45

## 2019-07-03 RX ADMIN — ACETAMINOPHEN 650 MG: 325 TABLET, FILM COATED ORAL at 21:25

## 2019-07-03 RX ADMIN — PANTOPRAZOLE SODIUM 40 MG: 40 INJECTION, POWDER, FOR SOLUTION INTRAVENOUS at 09:45

## 2019-07-03 RX ADMIN — PROPOFOL 100 MG: 10 INJECTION, EMULSION INTRAVENOUS at 07:20

## 2019-07-03 RX ADMIN — SODIUM CHLORIDE: 9 INJECTION, SOLUTION INTRAVENOUS at 01:38

## 2019-07-03 RX ADMIN — Medication 10 ML: at 09:45

## 2019-07-03 RX ADMIN — PROPOFOL 50 MG: 10 INJECTION, EMULSION INTRAVENOUS at 07:22

## 2019-07-03 RX ADMIN — Medication 10 ML: at 21:26

## 2019-07-03 RX ADMIN — SODIUM CHLORIDE: 9 INJECTION, SOLUTION INTRAVENOUS at 07:17

## 2019-07-03 RX ADMIN — Medication 10 ML: at 01:50

## 2019-07-03 RX ADMIN — PHENYLEPHRINE HYDROCHLORIDE 200 MCG: 10 INJECTION INTRAVENOUS at 07:30

## 2019-07-03 RX ADMIN — PANTOPRAZOLE SODIUM 40 MG: 40 INJECTION, POWDER, FOR SOLUTION INTRAVENOUS at 01:48

## 2019-07-03 ASSESSMENT — PAIN SCALES - GENERAL
PAINLEVEL_OUTOF10: 0
PAINLEVEL_OUTOF10: 8
PAINLEVEL_OUTOF10: 0
PAINLEVEL_OUTOF10: 8
PAINLEVEL_OUTOF10: 0
PAINLEVEL_OUTOF10: 0

## 2019-07-03 ASSESSMENT — PAIN DESCRIPTION - DESCRIPTORS: DESCRIPTORS: ACHING;CONSTANT;DISCOMFORT

## 2019-07-03 ASSESSMENT — PAIN DESCRIPTION - PROGRESSION: CLINICAL_PROGRESSION: NOT CHANGED

## 2019-07-03 ASSESSMENT — PAIN DESCRIPTION - ORIENTATION: ORIENTATION: LOWER

## 2019-07-03 ASSESSMENT — PAIN DESCRIPTION - FREQUENCY: FREQUENCY: CONTINUOUS

## 2019-07-03 ASSESSMENT — PAIN DESCRIPTION - ONSET: ONSET: ON-GOING

## 2019-07-03 ASSESSMENT — PAIN DESCRIPTION - LOCATION: LOCATION: BACK

## 2019-07-03 ASSESSMENT — PAIN DESCRIPTION - PAIN TYPE: TYPE: ACUTE PAIN

## 2019-07-03 NOTE — H&P
History and Physical      CHIEF COMPLAINT:  shortness of breath       HISTORY OF PRESENT ILLNESS:      The patient is a 68 y.o. female patient of Dr Sim Bonds who presents with shortness of breath and weakness from 1 weeks duration. She has a long history of iron deficiency anemia. 10 days ago she began to experience increasing shortness of breath and fatigue. She went to see her hematologist yesterday and was found to have a hemoglobin of 6.1. She was sent to the hospital for evaluation. She has had previous upper and lower endoscopy for iron deficiency anemia with no active bleeding noted. She has occasional melena but no nausea vomiting hematemesis or hematochezia. She denies abdominal pain fever chills. She does have a history of acid reflux. She also has remote history of breast cancer. She receives iron infusions from her hematologist on a regular basis.     Past Medical History:    Past Medical History:   Diagnosis Date    Anemia     Anemia     Asthma     COPD (chronic obstructive pulmonary disease) (HCC)     Gastroesophageal reflux     [place] (hard of hearing)     HX: breast cancer     Right breast    Iron deficiency        Past Surgical History:    Past Surgical History:   Procedure Laterality Date   Dirk Cleverly LUMPECTOMY  2010    [facility]    COLONOSCOPY N/A 3/8/2019    COLONOSCOPY DIAGNOSTIC performed by Lacy Munoz MD at [facility]  2010    Dr. Katrina Quesada ENDOSCOPY N/A 3/8/2019    EGD BIOPSY performed by Lacy Munoz MD at [address] [facility] [facility] N/A 7/3/2019    EGD ESOPHAGOGASTRODUODENOSCOPY performed by Lacy Munoz MD at [address] [facility]       Medications Prior to Admission:    Medications Prior to Admission: famotidine (PEPCID) 40 MG tablet, Take 1 tablet by mouth daily (Patient taking 4\" (1.626 m)   Wt 172 lb 8 oz (78.2 kg)   SpO2 97%   BMI 29.61 kg/m²     General appearance: alert, appears stated age, cooperative and pale  Head: Normocephalic, without obvious abnormality, atraumatic  Eyes: conjunctivae/corneas clear. PERRL, EOM's intact. Fundi benign. Ears: normal TM's and external ear canals both ears  Nose: Nares normal. Septum midline. Mucosa normal. No drainage or sinus tenderness.   Throat: lips, mucosa, and tongue normal; teeth and gums normal  Neck: no adenopathy, no carotid bruit, no JVD, supple, symmetrical, trachea midline and thyroid not enlarged, symmetric, no tenderness/mass/nodules  Lungs: clear to auscultation bilaterally  Heart: regular rate and rhythm, S1, S2 normal, no murmur, click, rub or gallop  Abdomen: soft, non-tender; bowel sounds normal; no masses,  no organomegaly  Extremities: extremities normal, atraumatic, no cyanosis or edema  Pulses: 2+ and symmetric  Skin: Skin color, texture, turgor normal. No rashes or lesions  Neurologic: Grossly normal    Results      Component Value Units   EKG 12 Lead [[id number]] Collected: 07/02/19 1716   Updated: 07/03/19 1539     Ventricular Rate 80 BPM    Atrial Rate 80 BPM    P-R Interval 188 ms    QRS Duration 84 ms    Q-T Interval 368 ms    QTc Calculation (Bazett) 424 ms    P Axis 22 degrees    R Axis 3 degrees    T Axis 12 degrees   Narrative:     Normal sinus rhythm  Normal ECG  No previous ECGs available  Confirmed by Flaco Rm ([id number]) on 7/3/2019 3:39:27 PM   Transfuse RBC [[id number]] Resulted: 07/03/19 1220   Updated: 07/03/19 1220    PREPARE RBC (CROSSMATCH), 2 Units [[id number]] Collected: 07/02/19 1702   Updated: 07/03/19 1110    Specimen Source: Blood     Product Code Blood Bank [id number]    Description Blood Bank Red Blood Cells, Leuko-reduced    Unit Number [id number]    Dispense Status Blood Bank issued    Product Code Blood Bank H0535H65    Description Blood Bank Red Blood Cells, Leuko-reduced    Unit Number [id number]    Dispense Status Blood Bank transfused    Product Code Blood Bank V3980C00    Description Blood Bank Red Blood Cells, Apheresis, Leuko-reduced    Unit Number [id number]    Dispense Status Blood Bank selected   Comprehensive Metabolic Panel [[id number]] (Abnormal) Collected: 07/03/19 0525   Updated: 07/03/19 0659    Specimen Type: Blood     Sodium 142 mmol/L    Potassium 3.9 mmol/L    Chloride 107 mmol/L    CO2 23 mmol/L    Anion Gap 12 mmol/L    Glucose 94 mg/dL    BUN 7Low  mg/dL    CREATININE 0.8 mg/dL    GFR Non-African American >60 mL/min/1.73    Comment: Chronic Kidney Disease: less than 60 ml/min/1.73 sq. m.         Kidney Failure: less than 15 ml/min/1.73 sq.m. Results valid for patients 18 years and older. GFR  >60    Calcium 8.8 mg/dL    Total Protein 5.5Low  g/dL    Alb 3.5 g/dL    Total Bilirubin 0.4 mg/dL    Alkaline Phosphatase 62 U/L    ALT 9 U/L    AST 12 U/L   Magnesium [[id number]] Collected: 07/03/19 0525   Updated: 07/03/19 0659    Specimen Type: Blood     Magnesium 2.0 mg/dL   CBC Auto Differential [[id number]] (Abnormal) Collected: 07/03/19 0525   Updated: 07/03/19 0610    Specimen Source: Blood     WBC 3.5Low  E9/L    RBC 2.95Low  E12/L    Hemoglobin 6.8Low  g/dL    Hematocrit 23.2Low  %    MCV 78.6Low  fL    MCH 23.1Low  pg    MCHC 29.3Low  %    RDW 15.5High  fL    Platelets 366 N4/Z    MPV 9.0 fL    Neutrophils % 61.1 %    Immature Granulocytes % 1.2 %    Lymphocytes % 23.8 %    Monocytes % 11.6 %    Eosinophils % 2.0 %    Basophils % 0.3 %    Neutrophils # 2.11 E9/L    Immature Granulocytes # 0.04 E9/L    Lymphocytes # 0. 82Low  E9/L    Monocytes # 0.40 E9/L    Eosinophils # 0.07 E9/L    Basophils # 0.01 E9/L   Troponin [[id number]] Collected: 07/03/19 0217   Updated: 07/03/19 0252    Specimen Source: Blood     Troponin <0.01 ng/mL    Comment: TROPONIN T BLOOD LEVELS:          0.03 ng/mL     Upper Reference Limit   0.04 - 0.09 ng/mL     Possible myocardial injury       >= 0.10 ng/mL     Myocardial injury       APTT [[id number]] Collected: 07/03/19 0217   Updated: 07/03/19 0245    Specimen Type: Blood     aPTT 26.6 sec   Protime-INR [[id number]] Collected: 07/03/19 0217   Updated: 07/03/19 0242    Specimen Type: Blood     Protime 12.0 sec    INR 1.0   Hemoglobin and hematocrit, blood [[id number]] (Abnormal) Collected: 07/03/19 0217   Updated: 07/03/19 0241    Specimen Source: Blood     Hemoglobin 7.3Low  g/dL    Hematocrit 24. 9Low  %   Transfuse RBC [[id number]] Resulted: 07/02/19 2141   Updated: 07/02/19 2141    CBC Auto Differential [[id number]] (Abnormal) Collected: 07/02/19 1702   Updated: 07/02/19 1913    Specimen Source: Blood     WBC 3.3Low  E9/L    RBC 2.56Low  E12/L    Hemoglobin 5.7Low Panic   g/dL    Hematocrit 19.9Low  %    MCV 77.7Low  fL    MCH 22.3Low  pg    MCHC 28.6Low  %    RDW 15.4High  fL    Platelets 737 Q8/J    MPV 9.3 fL    Neutrophils % 70.2 %    Immature Granulocytes % 0.6 %    Lymphocytes % 18.1Low  %    Monocytes % 10.2 %    Eosinophils % 0.6 %    Basophils % 0.3 %    Neutrophils # 2.33 E9/L    Immature Granulocytes # 0.02 E9/L    Lymphocytes # 0.60Low  E9/L    Monocytes # 0.34 E9/L    Eosinophils # 0. 02Low  E9/L    Basophils # 0.01 E9/L    Anisocytosis 2+    Polychromasia 2+    Hypochromia 3+    Poikilocytes 2+    Schistocytes 1+    Ovalocytes 1+   Narrative:     CALL  Mann  H34 tel. ,  Chemistry results called to and read back by Tara Beach RN, 07/02/2019  17:41, by [address] [place] [address] [[id number]] Collected: 07/02/19 1702   Updated: 07/02/19 1823    Specimen Source: Blood     ABO/Rh O POS    Antibody Screen NEG   Comprehensive Metabolic Panel [[id number]] Collected: 07/02/19 1702   Updated: 07/02/19 1746    Specimen Type: Blood     Sodium 139 mmol/L    Potassium 4.0 mmol/L    Chloride 103 mmol/L    CO2 28 mmol/L    Anion Gap 8 mmol/L    Glucose 97 mg/dL    BUN 11 mg/dL    CREATININE 0.7 mg/dL    GFR Non-African American >60

## 2019-07-03 NOTE — PROGRESS NOTES
Blood administration complete. VS taken. No s/s of blood transfusion reaction noted. Pt tolerated infusion well.

## 2019-07-04 VITALS
OXYGEN SATURATION: 95 % | BODY MASS INDEX: 29.45 KG/M2 | DIASTOLIC BLOOD PRESSURE: 60 MMHG | SYSTOLIC BLOOD PRESSURE: 126 MMHG | HEIGHT: 64 IN | WEIGHT: 172.5 LBS | HEART RATE: 73 BPM | RESPIRATION RATE: 18 BRPM | TEMPERATURE: 97.3 F

## 2019-07-04 LAB
HCT VFR BLD CALC: 28.6 % (ref 34–48)
HEMOGLOBIN: 8.6 G/DL (ref 11.5–15.5)

## 2019-07-04 PROCEDURE — 2580000003 HC RX 258: Performed by: SURGERY

## 2019-07-04 PROCEDURE — 96366 THER/PROPH/DIAG IV INF ADDON: CPT

## 2019-07-04 PROCEDURE — C9113 INJ PANTOPRAZOLE SODIUM, VIA: HCPCS | Performed by: SURGERY

## 2019-07-04 PROCEDURE — 96365 THER/PROPH/DIAG IV INF INIT: CPT

## 2019-07-04 PROCEDURE — 6360000002 HC RX W HCPCS: Performed by: SURGERY

## 2019-07-04 PROCEDURE — 96376 TX/PRO/DX INJ SAME DRUG ADON: CPT

## 2019-07-04 PROCEDURE — 85018 HEMOGLOBIN: CPT

## 2019-07-04 PROCEDURE — 2580000003 HC RX 258: Performed by: INTERNAL MEDICINE

## 2019-07-04 PROCEDURE — 36415 COLL VENOUS BLD VENIPUNCTURE: CPT

## 2019-07-04 PROCEDURE — G0378 HOSPITAL OBSERVATION PER HR: HCPCS

## 2019-07-04 PROCEDURE — 6360000002 HC RX W HCPCS: Performed by: INTERNAL MEDICINE

## 2019-07-04 PROCEDURE — 85014 HEMATOCRIT: CPT

## 2019-07-04 RX ORDER — PANTOPRAZOLE SODIUM 40 MG/1
40 TABLET, DELAYED RELEASE ORAL
Qty: 30 TABLET | Refills: 3 | Status: SHIPPED | OUTPATIENT
Start: 2019-07-04 | End: 2019-08-21 | Stop reason: SDUPTHER

## 2019-07-04 RX ORDER — PANTOPRAZOLE SODIUM 40 MG/1
40 TABLET, DELAYED RELEASE ORAL
Status: DISCONTINUED | OUTPATIENT
Start: 2019-07-05 | End: 2019-07-04 | Stop reason: HOSPADM

## 2019-07-04 RX ADMIN — SODIUM CHLORIDE 125 MG: 9 INJECTION, SOLUTION INTRAVENOUS at 13:06

## 2019-07-04 RX ADMIN — PANTOPRAZOLE SODIUM 40 MG: 40 INJECTION, POWDER, FOR SOLUTION INTRAVENOUS at 09:29

## 2019-07-04 RX ADMIN — Medication 10 ML: at 09:27

## 2019-07-04 ASSESSMENT — PAIN SCALES - GENERAL: PAINLEVEL_OUTOF10: 0

## 2019-07-04 NOTE — ANESTHESIA POSTPROCEDURE EVALUATION
Department of Anesthesiology  Postprocedure Note    Patient: Yong Davis  MRN: [id number]  YOB: 1945  Date of evaluation: 7/3/2019  Time:  10:00 PM     Procedure Summary     Date:  07/03/19 Room / Location:  [facility]    Anesthesia Start:  0717 Anesthesia Stop:  9369    Procedure:  EGD ESOPHAGOGASTRODUODENOSCOPY (N/A ) Diagnosis:  (?)    Surgeon:  Willis Argueta MD Responsible Provider:  Raven Lipscomb DO    Anesthesia Type:  MAC ASA Status:  4          Anesthesia Type: MAC    Sumaya Phase I: Sumaya Score: 9    Sumaya Phase II:      Last vitals: Reviewed and per EMR flowsheets.        Anesthesia Post Evaluation    Patient location during evaluation: PACU  Patient participation: complete - patient participated  Level of consciousness: awake and alert  Pain score: 1  Airway patency: patent  Nausea & Vomiting: no nausea and no vomiting  Complications: no  Cardiovascular status: hemodynamically stable  Respiratory status: acceptable  Hydration status: euvolemic

## 2019-07-11 ENCOUNTER — HOSPITAL ENCOUNTER (OUTPATIENT)
Dept: NUCLEAR MEDICINE | Age: 74
Discharge: HOME | End: 2019-07-13
Payer: MEDICARE

## 2019-07-11 DIAGNOSIS — D62 ACUTE BLOOD LOSS ANEMIA: ICD-10-CM

## 2019-07-11 DIAGNOSIS — K92.2 GASTROINTESTINAL HEMORRHAGE, UNSPECIFIED GASTROINTESTINAL HEMORRHAGE TYPE: ICD-10-CM

## 2019-07-11 PROCEDURE — 78278 ACUTE GI BLOOD LOSS IMAGING: CPT

## 2019-07-11 PROCEDURE — A9560 TC99M LABELED RBC: HCPCS | Performed by: RADIOLOGY

## 2019-07-11 PROCEDURE — 3430000000 HC RX DIAGNOSTIC RADIOPHARMACEUTICAL: Performed by: RADIOLOGY

## 2019-07-11 RX ADMIN — Medication 25 MILLICURIE: at 11:16

## 2019-07-17 ENCOUNTER — HOSPITAL ENCOUNTER (EMERGENCY)
Age: 74
Discharge: HOME | End: 2019-07-17
Attending: EMERGENCY MEDICINE
Payer: MEDICARE

## 2019-07-17 ENCOUNTER — APPOINTMENT (OUTPATIENT)
Dept: GENERAL RADIOLOGY | Age: 74
End: 2019-07-17
Payer: MEDICARE

## 2019-07-17 VITALS
WEIGHT: 170 LBS | DIASTOLIC BLOOD PRESSURE: 84 MMHG | TEMPERATURE: 97.2 F | SYSTOLIC BLOOD PRESSURE: 142 MMHG | OXYGEN SATURATION: 98 % | BODY MASS INDEX: 29.18 KG/M2 | RESPIRATION RATE: 16 BRPM | HEART RATE: 89 BPM

## 2019-07-17 DIAGNOSIS — T59.811A SMOKE INHALATION: Primary | ICD-10-CM

## 2019-07-17 LAB
B.E.: -5.6 MMOL/L (ref -3–3)
COHB: 0.3 % (ref 0–1.5)
CRITICAL: ABNORMAL
DATE ANALYZED: ABNORMAL
DATE OF COLLECTION: ABNORMAL
HCO3: 17.7 MMOL/L (ref 22–26)
HHB: 3.8 % (ref 0–5)
LAB: ABNORMAL
Lab: ABNORMAL
METHB: 0.7 % (ref 0–1.5)
MODE: ABNORMAL
O2 CONTENT: 16.7 ML/DL
O2 SATURATION: 96.2 % (ref 92–98.5)
O2HB: 95.2 % (ref 94–97)
OPERATOR ID: 421
PATIENT TEMP: 37 C
PCO2: 28.5 MMHG (ref 35–45)
PH BLOOD GAS: 7.41 (ref 7.35–7.45)
PO2: 84.8 MMHG (ref 60–100)
SOURCE, BLOOD GAS: ABNORMAL
THB: 12.4 G/DL (ref 11.5–16.5)
TIME ANALYZED: 1959
TROPONIN: <0.01 NG/ML (ref 0–0.03)

## 2019-07-17 PROCEDURE — 71045 X-RAY EXAM CHEST 1 VIEW: CPT

## 2019-07-17 PROCEDURE — 93005 ELECTROCARDIOGRAM TRACING: CPT | Performed by: EMERGENCY MEDICINE

## 2019-07-17 PROCEDURE — 94640 AIRWAY INHALATION TREATMENT: CPT

## 2019-07-17 PROCEDURE — 36415 COLL VENOUS BLD VENIPUNCTURE: CPT

## 2019-07-17 PROCEDURE — 82805 BLOOD GASES W/O2 SATURATION: CPT

## 2019-07-17 PROCEDURE — 84484 ASSAY OF TROPONIN QUANT: CPT

## 2019-07-17 PROCEDURE — 99284 EMERGENCY DEPT VISIT MOD MDM: CPT

## 2019-07-17 PROCEDURE — 94664 DEMO&/EVAL PT USE INHALER: CPT

## 2019-07-17 PROCEDURE — 6370000000 HC RX 637 (ALT 250 FOR IP): Performed by: EMERGENCY MEDICINE

## 2019-07-17 RX ORDER — IPRATROPIUM BROMIDE AND ALBUTEROL SULFATE 2.5; .5 MG/3ML; MG/3ML
1 SOLUTION RESPIRATORY (INHALATION) EVERY 4 HOURS PRN
Qty: 24 VIAL | Refills: 1 | Status: SHIPPED | OUTPATIENT
Start: 2019-07-17 | End: 2019-11-19

## 2019-07-17 RX ORDER — PREDNISONE 10 MG/1
40 TABLET ORAL DAILY
Qty: 20 TABLET | Refills: 0 | Status: SHIPPED | OUTPATIENT
Start: 2019-07-17 | End: 2019-07-22 | Stop reason: CLARIF

## 2019-07-17 RX ORDER — IPRATROPIUM BROMIDE AND ALBUTEROL SULFATE 2.5; .5 MG/3ML; MG/3ML
1 SOLUTION RESPIRATORY (INHALATION)
Status: COMPLETED | OUTPATIENT
Start: 2019-07-17 | End: 2019-07-17

## 2019-07-17 RX ORDER — ALBUTEROL SULFATE 90 UG/1
2 AEROSOL, METERED RESPIRATORY (INHALATION) EVERY 6 HOURS PRN
Qty: 1 INHALER | Refills: 0 | Status: SHIPPED | OUTPATIENT
Start: 2019-07-17 | End: 2019-11-19

## 2019-07-17 RX ADMIN — IPRATROPIUM BROMIDE AND ALBUTEROL SULFATE 1 AMPULE: .5; 3 SOLUTION RESPIRATORY (INHALATION) at 18:44

## 2019-07-17 RX ADMIN — IPRATROPIUM BROMIDE AND ALBUTEROL SULFATE 1 AMPULE: .5; 3 SOLUTION RESPIRATORY (INHALATION) at 18:45

## 2019-07-17 RX ADMIN — IPRATROPIUM BROMIDE AND ALBUTEROL SULFATE 1 AMPULE: .5; 3 SOLUTION RESPIRATORY (INHALATION) at 18:46
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
## 2019-07-18 ENCOUNTER — TELEPHONE (OUTPATIENT)
Dept: ADMINISTRATIVE | Age: 74
End: 2019-07-18

## 2019-07-18 LAB
EKG ATRIAL RATE: 95 BPM
EKG P AXIS: 34 DEGREES
EKG P-R INTERVAL: 192 MS
EKG Q-T INTERVAL: 374 MS
EKG QRS DURATION: 88 MS
EKG QTC CALCULATION (BAZETT): 469 MS
EKG R AXIS: -2 DEGREES
EKG T AXIS: 57 DEGREES
EKG VENTRICULAR RATE: 95 BPM

## 2019-07-18 PROCEDURE — 93010 ELECTROCARDIOGRAM REPORT: CPT | Performed by: INTERNAL MEDICINE

## 2019-07-22 ENCOUNTER — OFFICE VISIT (OUTPATIENT)
Dept: PRIMARY CARE CLINIC | Age: 74
End: 2019-07-22
Payer: MEDICARE

## 2019-07-22 VITALS
DIASTOLIC BLOOD PRESSURE: 74 MMHG | HEIGHT: 62 IN | BODY MASS INDEX: 31.83 KG/M2 | OXYGEN SATURATION: 97 % | SYSTOLIC BLOOD PRESSURE: 126 MMHG | WEIGHT: 173 LBS | HEART RATE: 76 BPM | TEMPERATURE: 99.1 F

## 2019-07-22 DIAGNOSIS — Z17.0 MALIGNANT NEOPLASM OF LEFT BREAST IN FEMALE, ESTROGEN RECEPTOR POSITIVE, UNSPECIFIED SITE OF BREAST (HCC): ICD-10-CM

## 2019-07-22 DIAGNOSIS — C50.912 MALIGNANT NEOPLASM OF LEFT BREAST IN FEMALE, ESTROGEN RECEPTOR POSITIVE, UNSPECIFIED SITE OF BREAST (HCC): ICD-10-CM

## 2019-07-22 DIAGNOSIS — K29.70 GASTRITIS WITHOUT BLEEDING, UNSPECIFIED CHRONICITY, UNSPECIFIED GASTRITIS TYPE: ICD-10-CM

## 2019-07-22 DIAGNOSIS — J68.0: Primary | ICD-10-CM

## 2019-07-22 DIAGNOSIS — D50.8 OTHER IRON DEFICIENCY ANEMIA: ICD-10-CM

## 2019-07-22 PROBLEM — D64.9 ABSOLUTE ANEMIA: Status: ACTIVE | Noted: 2019-07-22

## 2019-07-22 PROCEDURE — 99214 OFFICE O/P EST MOD 30 MIN: CPT | Performed by: FAMILY MEDICINE

## 2019-07-22 RX ORDER — FAMOTIDINE 40 MG/1
TABLET, FILM COATED ORAL
COMMUNITY
Start: 2013-09-10 | End: 2019-08-21

## 2019-07-22 ASSESSMENT — ENCOUNTER SYMPTOMS
RESPIRATORY NEGATIVE: 1
EYES NEGATIVE: 1
ALLERGIC/IMMUNOLOGIC NEGATIVE: 1
GASTROINTESTINAL NEGATIVE: 1

## 2019-07-22 NOTE — PROGRESS NOTES
Recommended pro-air just on a as needed basis. Beginning of July patient experienced gastrointestinal bleeding. Underwent evaluation by surgery upper and lower endoscopy was unremarkable. Recent capsule endoscopy was stable by history continue with Protonix daily. Hemoglobin improved with transfusions. Continues to follow with hematology. Remote breast cancer history left breast stage I disease all stable           Review of Systems   Constitutional: Negative. HENT: Negative. Eyes: Negative. Respiratory: Negative. No significant respiratory complaints today stable. Gastrointestinal: Negative. Endocrine: Negative. Genitourinary: Negative. Musculoskeletal: Negative. Skin: Negative. Allergic/Immunologic: Negative. Neurological: Negative. Hematological: Negative. Psychiatric/Behavioral: Negative. Current Outpatient Medications:     ipratropium-albuterol (DUONEB) 0.5-2.5 (3) MG/3ML SOLN nebulizer solution, Take 3 mLs by nebulization every 4 hours as needed for Shortness of Breath, Disp: 24 vial, Rfl: 1    Respiratory Therapy Supplies (FULL KIT NEBULIZER SET) MISC, Use as directed with nebulized medication. , Disp: 1 each, Rfl: 0    pantoprazole (PROTONIX) 40 MG tablet, Take 1 tablet by mouth every morning (before breakfast), Disp: 30 tablet, Rfl: 3    albuterol sulfate HFA (PROAIR HFA) 108 (90 Base) MCG/ACT inhaler, Inhale 2 puffs into the lungs every 4 hours as needed for Wheezing, Disp: , Rfl:     Glucosamine-Chondroitin (GLUCOSAMINE CHONDR COMPLEX PO), Take 2,000 mg by mouth daily. , Disp: , Rfl:     Omega-3 Fatty Acids (FISH OIL) 1000 MG CAPS, Take 520 mg by mouth daily , Disp: , Rfl:     therapeutic multivitamin-minerals (THERAGRAN-M) tablet, Take 1 tablet by mouth daily. , Disp: , Rfl:     Calcium Carbonate-Vitamin D (CALCIUM + D) 600-200 MG-UNIT TABS, Take  by mouth 2 times daily. , Disp: , Rfl:     famotidine (PEPCID) 40 MG tablet, Take by mouth, Disp: , Rfl:     albuterol sulfate HFA (PROAIR HFA) 108 (90 Base) MCG/ACT inhaler, Inhale 2 puffs into the lungs every 6 hours as needed for Wheezing (Patient not taking: Reported on 2019), Disp: 1 Inhaler, Rfl: 0    No Known Allergies    Social History     Tobacco Use    Smoking status: Former Smoker     Packs/day: 1.00     Years: 10.00     Pack years: 10.00     Last attempt to quit: 2002     Years since quittin.5    Smokeless tobacco: Never Used   Substance Use Topics    Alcohol use: Not on file    Drug use: No      Past Surgical History:   Procedure Laterality Date   [facility] LUMPECTOMY      Dr. Patricia Anders [facility]    COLONOSCOPY N/A 3/8/2019    COLONOSCOPY DIAGNOSTIC performed by Willis Argueta MD at [facility]      Dr. Jason Kyle N/A 3/8/2019    EGD BIOPSY performed by Willis Argueta MD at [address] [facility] [facility] N/A 7/3/2019    EGD ESOPHAGOGASTRODUODENOSCOPY performed by Willis Argueta MD at [facility] History   Problem Relation Age of Onset    Cancer Mother         Pancreatic and liver    Diabetes Father     Stroke Father     Lupus Sister     Breast Cancer Sister      Past Medical History:   Diagnosis Date    Anemia     Anemia     Asthma     COPD [facility]ronic obstructive pulmonary disease) ([facility] [address]     Gastroesophageal reflux     [place] (hard of hearing)     HX: breast cancer     Right breast    Iron deficiency        Vitals:    19 1041   BP: 126/74   Pulse: 76   Temp: 99.1 °F (37.3 °C)   SpO2: 97%   Weight: 173 lb (78.5 kg)   Height: 5' 2\" (1.575 m)     BP Readings from Last 3 Encounters:   19 126/74   19 (!) 142/84   19 126/60        Physical Exam   Constitutional: She is oriented to person, place, and time.  She appears well-developed and

## 2019-08-15 ENCOUNTER — HOSPITAL ENCOUNTER (OUTPATIENT)
Age: 74
Discharge: HOME | End: 2019-08-15
Payer: MEDICARE

## 2019-08-15 DIAGNOSIS — D50.8 OTHER IRON DEFICIENCY ANEMIA: ICD-10-CM

## 2019-08-15 DIAGNOSIS — Z17.0 MALIGNANT NEOPLASM OF LEFT BREAST IN FEMALE, ESTROGEN RECEPTOR POSITIVE, UNSPECIFIED SITE OF BREAST (HCC): ICD-10-CM

## 2019-08-15 DIAGNOSIS — C50.912 MALIGNANT NEOPLASM OF LEFT BREAST IN FEMALE, ESTROGEN RECEPTOR POSITIVE, UNSPECIFIED SITE OF BREAST (HCC): ICD-10-CM

## 2019-08-15 LAB
ALBUMIN SERPL-MCNC: 4.5 G/DL (ref 3.5–5.2)
ALP BLD-CCNC: 78 U/L (ref 35–104)
ALT SERPL-CCNC: 22 U/L (ref 0–32)
ANION GAP SERPL CALCULATED.3IONS-SCNC: 12 MMOL/L (ref 7–16)
AST SERPL-CCNC: 29 U/L (ref 0–31)
BASOPHILS ABSOLUTE: 0.03 E9/L (ref 0–0.2)
BASOPHILS RELATIVE PERCENT: 0.8 % (ref 0–2)
BILIRUB SERPL-MCNC: 0.4 MG/DL (ref 0–1.2)
BUN BLDV-MCNC: 13 MG/DL (ref 8–23)
CALCIUM SERPL-MCNC: 9.6 MG/DL (ref 8.6–10.2)
CHLORIDE BLD-SCNC: 102 MMOL/L (ref 98–107)
CO2: 28 MMOL/L (ref 22–29)
CREAT SERPL-MCNC: 0.8 MG/DL (ref 0.5–1)
EOSINOPHILS ABSOLUTE: 0.11 E9/L (ref 0.05–0.5)
EOSINOPHILS RELATIVE PERCENT: 2.8 % (ref 0–6)
GFR AFRICAN AMERICAN: >60
GFR NON-AFRICAN AMERICAN: >60 ML/MIN/1.73
GLUCOSE BLD-MCNC: 96 MG/DL (ref 74–99)
HCT VFR BLD CALC: 42.2 % (ref 34–48)
HEMOGLOBIN: 13.2 G/DL (ref 11.5–15.5)
IMMATURE GRANULOCYTES #: 0.01 E9/L
IMMATURE GRANULOCYTES %: 0.3 % (ref 0–5)
LYMPHOCYTES ABSOLUTE: 1.15 E9/L (ref 1.5–4)
LYMPHOCYTES RELATIVE PERCENT: 29 % (ref 20–42)
MCH RBC QN AUTO: 25 PG (ref 26–35)
MCHC RBC AUTO-ENTMCNC: 31.3 % (ref 32–34.5)
MCV RBC AUTO: 80.1 FL (ref 80–99.9)
MONOCYTES ABSOLUTE: 0.37 E9/L (ref 0.1–0.95)
MONOCYTES RELATIVE PERCENT: 9.3 % (ref 2–12)
NEUTROPHILS ABSOLUTE: 2.3 E9/L (ref 1.8–7.3)
NEUTROPHILS RELATIVE PERCENT: 57.8 % (ref 43–80)
PDW BLD-RTO: 18.5 FL (ref 11.5–15)
PLATELET # BLD: 253 E9/L (ref 130–450)
PMV BLD AUTO: 8.4 FL (ref 7–12)
POTASSIUM SERPL-SCNC: 3.9 MMOL/L (ref 3.5–5)
RBC # BLD: 5.27 E12/L (ref 3.5–5.5)
SODIUM BLD-SCNC: 142 MMOL/L (ref 132–146)
TOTAL PROTEIN: 7.2 G/DL (ref 6.4–8.3)
WBC # BLD: 4 E9/L (ref 4.5–11.5)

## 2019-08-15 PROCEDURE — 85025 COMPLETE CBC W/AUTO DIFF WBC: CPT

## 2019-08-15 PROCEDURE — 36415 COLL VENOUS BLD VENIPUNCTURE: CPT

## 2019-08-15 PROCEDURE — 80053 COMPREHEN METABOLIC PANEL: CPT

## 2019-08-21 ENCOUNTER — OFFICE VISIT (OUTPATIENT)
Dept: PRIMARY CARE CLINIC | Age: 74
End: 2019-08-21
Payer: MEDICARE

## 2019-08-21 VITALS — SYSTOLIC BLOOD PRESSURE: 122 MMHG | DIASTOLIC BLOOD PRESSURE: 80 MMHG

## 2019-08-21 DIAGNOSIS — J45.20 MILD INTERMITTENT ASTHMA WITHOUT COMPLICATION: ICD-10-CM

## 2019-08-21 DIAGNOSIS — K21.00 GASTROESOPHAGEAL REFLUX DISEASE WITH ESOPHAGITIS: Primary | ICD-10-CM

## 2019-08-21 DIAGNOSIS — E61.1 IRON DEFICIENCY: ICD-10-CM

## 2019-08-21 DIAGNOSIS — E78.5 HYPERLIPIDEMIA, UNSPECIFIED HYPERLIPIDEMIA TYPE: ICD-10-CM

## 2019-08-21 PROCEDURE — 99214 OFFICE O/P EST MOD 30 MIN: CPT | Performed by: FAMILY MEDICINE

## 2019-08-21 RX ORDER — PANTOPRAZOLE SODIUM 40 MG/1
40 TABLET, DELAYED RELEASE ORAL
Qty: 90 TABLET | Refills: 1 | Status: SHIPPED | OUTPATIENT
Start: 2019-08-21 | End: 2020-04-29 | Stop reason: SDUPTHER

## 2019-08-21 ASSESSMENT — ENCOUNTER SYMPTOMS
ALLERGIC/IMMUNOLOGIC NEGATIVE: 1
GASTROINTESTINAL NEGATIVE: 1
RESPIRATORY NEGATIVE: 1
EYES NEGATIVE: 1

## 2019-08-21 NOTE — PROGRESS NOTES
Neurological: Negative. Hematological: Negative. Psychiatric/Behavioral: Negative. Current systems review stable as noted. Current Outpatient Medications:     pantoprazole (PROTONIX) 40 MG tablet, Take 1 tablet by mouth every morning (before breakfast), Disp: 90 tablet, Rfl: 1    ipratropium-albuterol (DUONEB) 0.5-2.5 (3) MG/3ML SOLN nebulizer solution, Take 3 mLs by nebulization every 4 hours as needed for Shortness of Breath, Disp: 24 vial, Rfl: 1    albuterol sulfate HFA (PROAIR HFA) 108 (90 Base) MCG/ACT inhaler, Inhale 2 puffs into the lungs every 6 hours as needed for Wheezing (Patient not taking: Reported on 2019), Disp: 1 Inhaler, Rfl: 0    Respiratory Therapy Supplies (FULL KIT NEBULIZER SET) MISC, Use as directed with nebulized medication. , Disp: 1 each, Rfl: 0    albuterol sulfate HFA (PROAIR HFA) 108 (90 Base) MCG/ACT inhaler, Inhale 2 puffs into the lungs every 4 hours as needed for Wheezing, Disp: , Rfl:     Glucosamine-Chondroitin (GLUCOSAMINE CHONDR COMPLEX PO), Take 2,000 mg by mouth daily. , Disp: , Rfl:     Omega-3 Fatty Acids (FISH OIL) 1000 MG CAPS, Take 520 mg by mouth daily , Disp: , Rfl:     therapeutic multivitamin-minerals (THERAGRAN-M) tablet, Take 1 tablet by mouth daily. , Disp: , Rfl:     Calcium Carbonate-Vitamin D (CALCIUM + D) 600-200 MG-UNIT TABS, Take  by mouth 2 times daily. , Disp: , Rfl:     No Known Allergies    Social History     Tobacco Use    Smoking status: Former Smoker     Packs/day: 1.00     Years: 10.00     Pack years: 10.00     Last attempt to quit: 2002     Years since quittin.6    Smokeless tobacco: Never Used   Substance Use Topics    Alcohol use: Not on file    Drug use: No      Past Surgical History:   Procedure Laterality Date   Kaycee George LUMPECTOMY      Dr. Arnaud Valencia [facility]    COLONOSCOPY N/A 3/8/2019    COLONOSCOPY DIAGNOSTIC performed by Marietta Membreno MD at [facility] medications and care. Reassessment 3 months          No results found for: LDLCHOLESTEROL    No results found for: [facility]  Lab Results   Component Value Date    WBC 4.0 (L) 08/15/2019    HGB 13.2 08/15/2019    HCT 42.2 08/15/2019    MCV 80.1 08/15/2019     08/15/2019    LYMPHOPCT 29.0 08/15/2019    RBC 5.27 08/15/2019    MCH 25.0 (L) 08/15/2019    MCHC 31.3 (L) 08/15/2019    RDW 18.5 (H) 08/15/2019     Lab Results   Component Value Date     08/15/2019    K 3.9 08/15/2019     08/15/2019    CO2 28 08/15/2019    BUN 13 08/15/2019    CREATININE 0.8 08/15/2019    GLUCOSE 96 08/15/2019    CALCIUM 9.6 08/15/2019    PROT 7.2 08/15/2019    LABALBU 4.5 08/15/2019    BILITOT 0.4 08/15/2019    ALKPHOS 78 08/15/2019    AST 29 08/15/2019    ALT 22 08/15/2019    LABGLOM >60 08/15/2019    GFRAA >60 08/15/2019      No results found for: PSA, PSADIA   No results found for: LABA1C  No results found for: EAG         Plan Per Assessment:  Mateo Guerra was seen today for other. Diagnoses and all orders for this visit:    Gastroesophageal reflux disease with esophagitis  -     CBC Auto Differential; Future  -     Comprehensive Metabolic Panel; Future  -     TSH without Reflex; Future  -     Lipid Panel; Future    Iron deficiency  -     CBC Auto Differential; Future  -     Comprehensive Metabolic Panel; Future  -     TSH without Reflex; Future  -     Lipid Panel; Future    Mild intermittent asthma without complication    Hyperlipidemia, unspecified hyperlipidemia type  -     CBC Auto Differential; Future  -     Comprehensive Metabolic Panel; Future  -     TSH without Reflex; Future  -     Lipid Panel; Future    Other orders  -     pantoprazole (PROTONIX) 40 MG tablet; Take 1 tablet by mouth every morning (before breakfast)            Return in about 3 months (around 11/21/2019). Bertram Dhillon DO    Note was generated with the assistance of voice recognition software.   Document was reviewed however may

## 2019-11-15 DIAGNOSIS — E78.2 MIXED HYPERLIPIDEMIA: ICD-10-CM

## 2019-11-15 DIAGNOSIS — Z17.1 MALIGNANT NEOPLASM OF UPPER-OUTER QUADRANT OF LEFT BREAST IN FEMALE, ESTROGEN RECEPTOR NEGATIVE (HCC): ICD-10-CM

## 2019-11-15 DIAGNOSIS — K21.00 GASTROESOPHAGEAL REFLUX DISEASE WITH ESOPHAGITIS: ICD-10-CM

## 2019-11-15 DIAGNOSIS — C50.412 MALIGNANT NEOPLASM OF UPPER-OUTER QUADRANT OF LEFT BREAST IN FEMALE, ESTROGEN RECEPTOR NEGATIVE (HCC): ICD-10-CM

## 2019-11-15 DIAGNOSIS — K21.9 GASTROESOPHAGEAL REFLUX DISEASE WITHOUT ESOPHAGITIS: ICD-10-CM

## 2019-11-15 DIAGNOSIS — E61.1 IRON DEFICIENCY: ICD-10-CM

## 2019-11-15 DIAGNOSIS — E78.5 HYPERLIPIDEMIA, UNSPECIFIED HYPERLIPIDEMIA TYPE: ICD-10-CM

## 2019-11-15 DIAGNOSIS — D50.9 IRON DEFICIENCY ANEMIA, UNSPECIFIED IRON DEFICIENCY ANEMIA TYPE: ICD-10-CM

## 2019-11-15 LAB
ALBUMIN SERPL-MCNC: NORMAL G/DL
ALP BLD-CCNC: NORMAL U/L
ALT SERPL-CCNC: NORMAL U/L
ANION GAP SERPL CALCULATED.3IONS-SCNC: NORMAL MMOL/L
AST SERPL-CCNC: NORMAL U/L
BASOPHILS ABSOLUTE: NORMAL
BASOPHILS RELATIVE PERCENT: NORMAL
BILIRUB SERPL-MCNC: NORMAL MG/DL
BUN BLDV-MCNC: 14 MG/DL
CALCIUM SERPL-MCNC: NORMAL MG/DL
CHLORIDE BLD-SCNC: NORMAL MMOL/L
CHOLESTEROL, TOTAL: 177 MG/DL
CHOLESTEROL/HDL RATIO: 3.1
CO2: NORMAL
CREAT SERPL-MCNC: 0.82 MG/DL
EOSINOPHILS ABSOLUTE: NORMAL
EOSINOPHILS RELATIVE PERCENT: NORMAL
GFR CALCULATED: NORMAL
GLUCOSE BLD-MCNC: 104 MG/DL
HCT VFR BLD CALC: NORMAL %
HDLC SERPL-MCNC: 58 MG/DL (ref 35–70)
HEMOGLOBIN: NORMAL
LDL CHOLESTEROL CALCULATED: 98 MG/DL (ref 0–160)
LYMPHOCYTES ABSOLUTE: NORMAL
LYMPHOCYTES RELATIVE PERCENT: NORMAL
MCH RBC QN AUTO: NORMAL PG
MCHC RBC AUTO-ENTMCNC: NORMAL G/DL
MCV RBC AUTO: NORMAL FL
MONOCYTES ABSOLUTE: NORMAL
MONOCYTES RELATIVE PERCENT: NORMAL
NEUTROPHILS ABSOLUTE: NORMAL
NEUTROPHILS RELATIVE PERCENT: NORMAL
PDW BLD-RTO: NORMAL %
PLATELET # BLD: NORMAL 10*3/UL
PMV BLD AUTO: NORMAL FL
POTASSIUM SERPL-SCNC: 4.2 MMOL/L
RBC # BLD: NORMAL 10*6/UL
SODIUM BLD-SCNC: NORMAL MMOL/L
TOTAL PROTEIN: NORMAL
TRIGL SERPL-MCNC: 113 MG/DL
TSH SERPL DL<=0.05 MIU/L-ACNC: 3.66 UIU/ML
VLDLC SERPL CALC-MCNC: NORMAL MG/DL
WBC # BLD: NORMAL 10*3/UL

## 2019-11-19 ENCOUNTER — OFFICE VISIT (OUTPATIENT)
Dept: PRIMARY CARE CLINIC | Age: 74
End: 2019-11-19
Payer: MEDICARE

## 2019-11-19 VITALS
HEART RATE: 78 BPM | RESPIRATION RATE: 18 BRPM | OXYGEN SATURATION: 94 % | SYSTOLIC BLOOD PRESSURE: 124 MMHG | TEMPERATURE: 97.6 F | BODY MASS INDEX: 32.19 KG/M2 | DIASTOLIC BLOOD PRESSURE: 72 MMHG | WEIGHT: 176 LBS

## 2019-11-19 DIAGNOSIS — Z85.3 HX: BREAST CANCER: ICD-10-CM

## 2019-11-19 DIAGNOSIS — M19.90 OSTEOARTHRITIS, UNSPECIFIED OSTEOARTHRITIS TYPE, UNSPECIFIED SITE: ICD-10-CM

## 2019-11-19 DIAGNOSIS — Z23 NEED FOR INFLUENZA VACCINATION: ICD-10-CM

## 2019-11-19 DIAGNOSIS — K21.00 GASTROESOPHAGEAL REFLUX DISEASE WITH ESOPHAGITIS: Primary | ICD-10-CM

## 2019-11-19 DIAGNOSIS — E78.5 HYPERLIPIDEMIA, UNSPECIFIED HYPERLIPIDEMIA TYPE: ICD-10-CM

## 2019-11-19 PROCEDURE — G8482 FLU IMMUNIZE ORDER/ADMIN: HCPCS | Performed by: FAMILY MEDICINE

## 2019-11-19 PROCEDURE — G0008 ADMIN INFLUENZA VIRUS VAC: HCPCS | Performed by: FAMILY MEDICINE

## 2019-11-19 PROCEDURE — 1090F PRES/ABSN URINE INCON ASSESS: CPT | Performed by: FAMILY MEDICINE

## 2019-11-19 PROCEDURE — 90653 IIV ADJUVANT VACCINE IM: CPT | Performed by: FAMILY MEDICINE

## 2019-11-19 PROCEDURE — 1123F ACP DISCUSS/DSCN MKR DOCD: CPT | Performed by: FAMILY MEDICINE

## 2019-11-19 PROCEDURE — 4040F PNEUMOC VAC/ADMIN/RCVD: CPT | Performed by: FAMILY MEDICINE

## 2019-11-19 PROCEDURE — 3017F COLORECTAL CA SCREEN DOC REV: CPT | Performed by: FAMILY MEDICINE

## 2019-11-19 PROCEDURE — G8417 CALC BMI ABV UP PARAM F/U: HCPCS | Performed by: FAMILY MEDICINE

## 2019-11-19 PROCEDURE — 99214 OFFICE O/P EST MOD 30 MIN: CPT | Performed by: FAMILY MEDICINE

## 2019-11-19 PROCEDURE — 1036F TOBACCO NON-USER: CPT | Performed by: FAMILY MEDICINE

## 2019-11-19 PROCEDURE — G8400 PT W/DXA NO RESULTS DOC: HCPCS | Performed by: FAMILY MEDICINE

## 2019-11-19 PROCEDURE — G8427 DOCREV CUR MEDS BY ELIG CLIN: HCPCS | Performed by: FAMILY MEDICINE

## 2019-11-19 RX ORDER — IBUPROFEN 800 MG/1
800 TABLET ORAL 2 TIMES DAILY PRN
Qty: 60 TABLET | Refills: 0 | Status: SHIPPED
Start: 2019-11-19 | End: 2020-09-30

## 2019-11-19 ASSESSMENT — ENCOUNTER SYMPTOMS
ALLERGIC/IMMUNOLOGIC NEGATIVE: 1
GASTROINTESTINAL NEGATIVE: 1
EYES NEGATIVE: 1
RESPIRATORY NEGATIVE: 1

## 2019-12-19 PROBLEM — Z23 NEED FOR INFLUENZA VACCINATION: Status: RESOLVED | Noted: 2019-11-19 | Resolved: 2019-12-19

## 2020-01-27 ENCOUNTER — TELEPHONE (OUTPATIENT)
Dept: ADMINISTRATIVE | Age: 75
End: 2020-01-27

## 2020-04-29 RX ORDER — PANTOPRAZOLE SODIUM 40 MG/1
40 TABLET, DELAYED RELEASE ORAL
Qty: 90 TABLET | Refills: 1 | Status: SHIPPED
Start: 2020-04-29 | End: 2020-09-30 | Stop reason: SDUPTHER

## 2020-06-04 ENCOUNTER — HOSPITAL ENCOUNTER (OUTPATIENT)
Dept: GENERAL RADIOLOGY | Age: 75
Discharge: HOME | End: 2020-06-06
Payer: MEDICARE

## 2020-06-04 PROCEDURE — 77063 BREAST TOMOSYNTHESIS BI: CPT

## 2020-08-12 ENCOUNTER — TELEPHONE (OUTPATIENT)
Dept: PRIMARY CARE CLINIC | Age: 75
End: 2020-08-12

## 2020-09-30 ENCOUNTER — OFFICE VISIT (OUTPATIENT)
Dept: PRIMARY CARE CLINIC | Age: 75
End: 2020-09-30
Payer: MEDICARE

## 2020-09-30 VITALS
SYSTOLIC BLOOD PRESSURE: 124 MMHG | HEIGHT: 64 IN | RESPIRATION RATE: 20 BRPM | WEIGHT: 177 LBS | OXYGEN SATURATION: 98 % | HEART RATE: 101 BPM | BODY MASS INDEX: 30.22 KG/M2 | DIASTOLIC BLOOD PRESSURE: 74 MMHG | TEMPERATURE: 97.8 F

## 2020-09-30 VITALS
BODY MASS INDEX: 30.38 KG/M2 | TEMPERATURE: 97.8 F | SYSTOLIC BLOOD PRESSURE: 124 MMHG | RESPIRATION RATE: 20 BRPM | WEIGHT: 177 LBS | DIASTOLIC BLOOD PRESSURE: 74 MMHG | HEART RATE: 101 BPM

## 2020-09-30 PROBLEM — J68.0: Status: RESOLVED | Noted: 2019-07-22 | Resolved: 2020-09-30

## 2020-09-30 PROBLEM — R53.83 FATIGUE: Status: ACTIVE | Noted: 2020-09-30

## 2020-09-30 PROCEDURE — G0439 PPPS, SUBSEQ VISIT: HCPCS | Performed by: PHYSICIAN ASSISTANT

## 2020-09-30 PROCEDURE — 99214 OFFICE O/P EST MOD 30 MIN: CPT | Performed by: FAMILY MEDICINE

## 2020-09-30 PROCEDURE — 90694 VACC AIIV4 NO PRSRV 0.5ML IM: CPT | Performed by: PHYSICIAN ASSISTANT

## 2020-09-30 PROCEDURE — G0008 ADMIN INFLUENZA VIRUS VAC: HCPCS | Performed by: PHYSICIAN ASSISTANT

## 2020-09-30 RX ORDER — GLUCOSAMINE/CHONDR SU A SOD 750-600 MG
TABLET ORAL
COMMUNITY
Start: 2012-10-27

## 2020-09-30 RX ORDER — CHLORAL HYDRATE 500 MG
CAPSULE ORAL
COMMUNITY
Start: 2011-02-15

## 2020-09-30 RX ORDER — ALBUTEROL SULFATE 90 UG/1
AEROSOL, METERED RESPIRATORY (INHALATION)
COMMUNITY
Start: 2018-06-19 | End: 2020-12-30 | Stop reason: SDUPTHER

## 2020-09-30 RX ORDER — IBUPROFEN 200 MG
CAPSULE ORAL
COMMUNITY
Start: 2011-02-15 | End: 2020-09-30

## 2020-09-30 RX ORDER — IBUPROFEN 800 MG/1
TABLET ORAL
COMMUNITY
Start: 2017-07-27

## 2020-09-30 RX ORDER — PANTOPRAZOLE SODIUM 40 MG/1
40 TABLET, DELAYED RELEASE ORAL
Qty: 90 TABLET | Refills: 1 | Status: SHIPPED
Start: 2020-09-30 | End: 2021-04-20 | Stop reason: SDUPTHER

## 2020-09-30 ASSESSMENT — PATIENT HEALTH QUESTIONNAIRE - PHQ9
1. LITTLE INTEREST OR PLEASURE IN DOING THINGS: 1
SUM OF ALL RESPONSES TO PHQ QUESTIONS 1-9: 2
2. FEELING DOWN, DEPRESSED OR HOPELESS: 1
SUM OF ALL RESPONSES TO PHQ9 QUESTIONS 1 & 2: 2
SUM OF ALL RESPONSES TO PHQ QUESTIONS 1-9: 2

## 2020-09-30 ASSESSMENT — LIFESTYLE VARIABLES
AUDIT-C TOTAL SCORE: INCOMPLETE
HOW OFTEN DO YOU HAVE A DRINK CONTAINING ALCOHOL: NEVER
AUDIT TOTAL SCORE: INCOMPLETE
HOW OFTEN DO YOU HAVE A DRINK CONTAINING ALCOHOL: 0

## 2020-09-30 ASSESSMENT — ENCOUNTER SYMPTOMS
RESPIRATORY NEGATIVE: 1
GASTROINTESTINAL NEGATIVE: 1
ALLERGIC/IMMUNOLOGIC NEGATIVE: 1
EYES NEGATIVE: 1

## 2020-09-30 NOTE — PROGRESS NOTES
Medicare Annual Wellness Visit  Name: Eric Foote Date: 2020   MRN: [id number] Sex: Female   Age: 77 y.o. Ethnicity: Non-/Non    : 1945 Race: Jaylen Carvalho is here for Medicare AWV    Screenings for behavioral, psychosocial and functional/safety risks, and cognitive dysfunction are all negative except as indicated below. These results, as well as other patient data from the [address] form, are documented in Flowsheets linked to this Encounter. Patient agrees to a flu vaccine today. Patient declines Tdap at this time but will consider in the future. She refuses the Shingrix due to cost.      Patient had mammogram in 2020. Up-to-date on colonoscopy. Patient was given script for DEXA scan. No Known Allergies    Prior to Visit Medications    Medication Sig Taking? Authorizing Provider   pantoprazole (PROTONIX) 40 MG tablet Take 1 tablet by mouth every morning (before breakfast) Yes Rodney Aguilar DO   ibuprofen (ADVIL;MOTRIN) 800 MG tablet Take 1 tablet by mouth 2 times daily as needed for Pain Yes Rodney Aguilar, DO   albuterol sulfate HFA (PROAIR HFA) 108 (90 Base) MCG/ACT inhaler Inhale 2 puffs into the lungs every 4 hours as needed for Wheezing Yes Historical Provider, MD   Glucosamine-Chondroitin (GLUCOSAMINE CHONDR COMPLEX PO) Take 2,000 mg by mouth daily. Yes Historical Provider, MD   Omega-3 Fatty Acids (FISH OIL) 1000 MG CAPS Take 520 mg by mouth daily  Yes Historical Provider, MD   therapeutic multivitamin-minerals (THERAGRAN-M) tablet Take 1 tablet by mouth daily. Yes Historical Provider, MD   Calcium Carbonate-Vitamin D (CALCIUM + D) 600-200 MG-UNIT TABS Take  by mouth 2 times daily.  Yes Historical Provider, MD       Past Medical History:   Diagnosis Date    Anemia     Anemia     Asthma     COPD (chronic obstructive pulmonary disease) ([address]     Gastroesophageal reflux     [place] (hard of hearing)     HX: breast cancer Right breast    Iron deficiency        Past Surgical History:   Procedure Laterality Date   Mary Ann Miller LUMPECTOMY  2010    Dr. Gary Calles [facility]    COLONOSCOPY N/A 3/8/2019    COLONOSCOPY DIAGNOSTIC performed by Jenny Kelly MD at [facility]  2010    Dr. Elizabeth Ayala N/A 3/8/2019    EGD BIOPSY performed by Jenny Kelly MD at [facility] N/A 7/3/2019    EGD ESOPHAGOGASTRODUODENOSCOPY performed by Jenny Kelly MD at [place] [address] History   Problem Relation Age of Onset    Cancer Mother         Pancreatic and liver    Diabetes Father     Stroke Father     Lupus Sister     Breast Cancer Sister        CareTeam (Including outside providers/suppliers regularly involved in providing care):   Patient Care Team:  Cristian Xiao DO as PCP - General (Family Medicine)  Cristian Xiao DO as PCP - [facility] Rogelio Stovall Provider    Wt Readings from Last 3 Encounters:   09/30/20 177 lb (80.3 kg)   11/19/19 176 lb (79.8 kg)   07/22/19 173 lb (78.5 kg)     Vitals:    09/30/20 1251   BP: 124/74   Pulse: 101   Resp: 20   Temp: 97.8 °F (36.6 °C)   SpO2: 98%   Weight: 177 lb (80.3 kg)   Height: 5' 4\" (1.626 m)     Body mass index is 30.38 kg/m². Based upon direct observation of the patient, evaluation of cognition reveals recent and remote memory intact. Nurses note and vital signs reviewed and patient is not hypoxic. ? General: The patient appears well and in no apparent distress. Patient is resting comfortably on cart. Skin: Warm, dry, no pallor noted. There is no rash noted. Head: Normocephalic, atraumatic.   Eye: Normal conjunctiva  Ears, Nose, Mouth, and Throat: Oral mucosa is moist  Cardiovascular: Regular Rate and Rhythm, no murmur, no murmur with Valsalva maneuver  Respiratory: Patient is in no distress, no accessory muscle use, lungs are clear to auscultation, no wheezing, crackles or rhonchi  Back: Non-tender, no CVA tenderness bilaterally to percussion. GI: Normal bowel sounds, no tenderness to palpation, no masses appreciated. No rebound, guarding, or rigidity noted. : Exam deferred   Musculoskeletal: The patient has no evidence of calf tenderness, no pitting edema, symmetrical pulses noted bilaterally  Neurological: A&O x4, normal speech      Patient's complete Health Risk Assessment and screening values have been reviewed and are found in Flowsheets. The following problems were reviewed today and where indicated follow up appointments were made and/or referrals ordered. Positive Risk Factor Screenings with Interventions:     Fall Risk:  2 or more falls in past year?: no  Fall with injury in past year?: (!) yes  Fall Risk Interventions:    · Home safety tips provided    General Health:  General  In general, how would you say your health is?: Fair  In the past 7 days, have you experienced any of the following? New or Increased Pain, New or Increased Fatigue, Loneliness, Social Isolation, Stress or Anger?: (!) Loneliness  Do you get the social and emotional support that you need?: (!) No  Do you have a Living Will?: (!) No  General Health Risk Interventions:  · Loneliness: patient declines any further intervention for this issue  · No Living Will: additional information provided information provided    Health Habits/Nutrition:  Health Habits/Nutrition  Do you exercise for at least 20 minutes 2-3 times per week?: (!) No  Have you lost any weight without trying in the past 3 months?: No  Do you eat fewer than 2 meals per day?: No  Have you seen a dentist within the past year?: (!) No  Body mass index is 30.38 kg/m².   Health Habits/Nutrition Interventions:  · Inadequate physical activity:  patient is not ready to increase his/her physical activity level at this time  · Dental exam overdue:  patient encouraged to make appointment with his/her dentist    Hearing/Vision:  No exam data present  Hearing/Vision  Do you or your family notice any trouble with your hearing?: (!) Yes  Do you have difficulty driving, watching TV, or doing any of your daily activities because of your eyesight?: No  Have you had an eye exam within the past year?: (!) No  Hearing/Vision Interventions:  · Hearing concerns:  patient declines any further evaluation/treatment for hearing issues  · Vision concerns:  patient encouraged to make appointment with his/her eye specialist    Personalized Preventive Plan   Current Health Maintenance Status  Immunization History   Administered Date(s) Administered    Influenza Virus Vaccine 10/10/2013, 09/21/2015, 10/27/2016, 10/17/2017    Influenza, High Dose (Fluzone 65 yrs and older) 09/18/2018    Influenza, Triv, inactivated, subunit, adjuvanted, IM (Fluad 65 yrs and older) 11/19/2019    Pneumococcal Conjugate 13-valent (Rand Linker) 06/16/2017    Pneumococcal Polysaccharide ([id number]) 10/10/2013        Health Maintenance   Topic Date Due    Hepatitis C screen  1945    DTaP/Tdap/Td vaccine (1 - Tdap) 09/03/1965    Shingles Vaccine (1 of 2) 09/03/1996    DEXA (modify frequency per FRAX score)  09/03/2001    Annual Wellness Visit (AWV)  06/04/2020    Flu vaccine (1) 09/01/2020    Breast cancer screen  06/04/2021    Lipid screen  11/14/2024    Colon cancer screen colonoscopy  03/08/2029    Pneumococcal 65+ years Vaccine  Completed    Hepatitis A vaccine  Aged Out    Hepatitis B vaccine  Aged Out    Hib vaccine  Aged Out    Meningococcal (ACWY) vaccine  Aged Out     Recommendations for [employer] Due: see orders and patient instructions/AVS.  . Recommended screening schedule for the next 5-10 years is provided to the patient in written form: see Patient Elias Meyer was seen today for medicare awv.     Diagnoses and all orders for this visit:    Asymptomatic menopausal state  -     DEXA Bone Density Axial Skeleton; Future    Routine general medical examination at a health care facility    Other orders  -     INFLUENZA, QUADV, ADJUVANTED, 72 YRS =, IM, PF, PREFILL SYR, 0.5ML (FLUAD)                  Obesity Counseling: Assessed behavioral health risks and factors affecting choice of behavior. Suggested weight control approaches, including dietary changes behavioral modification and follow up plan. Provided educational and support documentation. Time spent (minutes): < 5 minutes    Cardiovascular Disease Risk Counseling: Assessed the patient's risk to develop cardiovascular disease and reviewed main risk factors. Reviewed steps to reduce disease risk including:   · Quitting tobacco use, reducing amount smoked, or not starting the habit  · Making healthy food choices  · Being physically active and gradualy increasing activity levels   · Reduce weight and determine a healthy BMI goal  · Monitor blood pressure and treat if higher than 140/90 mmHg  · Maintain blood total cholesterol levels under 5 mmol/l or 190 mg/dl  · Maintain LDL cholesterol levels under 3.0 mmol/l or 115 mg/dl   · Control blood glucose levels  · Consider taking aspirin (75 mg daily), once blood pressure is controlled   Provided a follow up plan.   Time spent (minutes): < 5 minutes

## 2020-09-30 NOTE — PATIENT INSTRUCTIONS
Advance Directives: Care Instructions  Overview  An advance directive is a legal way to state your wishes at the end of your life. It tells your family and your doctor what to do if you can't say what you want. There are two main types of advance directives. You can change them any time your wishes change. Living will. This form tells your family and your doctor your wishes about life support and other treatment. The form is also called a declaration. Medical power of . This form lets you name a person to make treatment decisions for you when you can't speak for yourself. This person is called a health care agent (health care proxy, health care surrogate). The form is also called a durable power of  for health care. If you do not have an advance directive, decisions about your medical care may be made by a family member, or by a doctor or a  who doesn't know you. It may help to think of an advance directive as a gift to the people who care for you. If you have one, they won't have to make tough decisions by themselves. Follow-up care is a key part of your treatment and safety. Be sure to make and go to all appointments, and call your doctor if you are having problems. It's also a good idea to know your test results and keep a list of the medicines you take. What should you include in an advance directive? Many states have a unique advance directive form. (It may ask you to address specific issues.) Or you might use a universal form that's approved by many states. If your form doesn't tell you what to address, it may be hard to know what to include in your advance directive. Use the questions below to help you get started. · Who do you want to make decisions about your medical care if you are not able to? · What life-support measures do you want if you have a serious illness that gets worse over time or can't be cured? · What are you most afraid of that might happen? (Maybe you're afraid of having pain, losing your independence, or being kept alive by machines.)  · Where would you prefer to die? (Your home? A hospital? A nursing home?)  · Do you want to donate your organs when you die? · Do you want certain Islam practices performed before you die? When should you call for help? Be sure to contact your doctor if you have any questions. Where can you learn more? Go to [email or web address]://[email or web address] and sign in to your better. account. Enter R264 in the mydeco box to learn more about \"Advance Directives: Care Instructions. \"     If you do not have an account, please click on the \"Sign Up Now\" link. Current as of: December 9, 2019               Content Version: 12.5  © 6849-8881 Healthwise, Incorporated. Care instructions adapted under license by [facility] ([facility]). If you have questions about a medical condition or this instruction, always ask your healthcare professional. [place] [address] any warranty or liability for your use of this information. Learning About Living [place]roy  What is a living will? A living will, also called a declaration, is a legal form. It tells your family and your doctor your wishes when you can't speak for yourself. It's used by the health professionals who will treat you as you near the end of your life or if you get seriously hurt or ill. If you put your wishes in writing, your loved ones and others will know what kind of care you want. They won't need to guess. This can ease your mind and be helpful to others. And you can change or cancel your living will at any time. A living will is not the same as an estate or property will. An estate will explains what you want to happen with your money and property after you die. How do you use it? A living will is used to describe the kinds of treatment or life support you want as you near the end of your life or if you get seriously hurt or ill. Keep these facts in mind about living amos. · Your living will is used only if you can't speak or make decisions for yourself. Most often, one or more doctors must certify that you can't speak or decide for yourself before your living will takes effect. · If you get better and can speak for yourself again, you can accept or refuse any treatment. It doesn't matter what you said in your living will. · Some states may limit your right to refuse treatment in certain cases. For example, you may need to clearly state in your living will that you don't want artificial hydration and nutrition, such as being fed through a tube. Is a living will a legal document? A living will is a legal document. Each state has its own laws about living amos. And a living will may be called something else in your state. Here are some things to know about living amos. · You don't need an  to complete a living will. But legal advice can be helpful if your state's laws are unclear. It can also help if your health history is complicated or your family can't agree on what should be in your living will. · You can change your living will at any time. Some people find that their wishes about end-of-life care change as their health changes. If you make big changes to your living will, complete a new form. · If you move to another state, make sure that your living will is legal in the state where you now live. In most cases, doctors will respect your wishes even if you have a form from a different state. · You might use a universal form that has been approved by many states. This kind of form can sometimes be filled out and stored online. Your digital copy will then be available wherever you have a connection to the internet. The doctors and nurses who need to treat you can find it right away. · Your state may offer an online registry. This is another place where you can store your living will online.   · It's a good idea to get your living will notarized. This means using a person called a  to watch two people sign, or witness, your living will. What should you know when you create a living will? Here are some questions to ask yourself as you make your living will:  · Do you know enough about life support methods that might be used? If not, talk to your doctor so you know what might be done if you can't breathe on your own, your heart stops, or you can't swallow. · What things would you still want to be able to do after you receive life-support methods? Would you want to be able to walk? To speak? To eat on your own? To live without the help of machines? · Do you want certain Presybeterian practices performed if you become very ill? · If you have a choice, where do you want to be cared for? In your home? At a hospital or nursing home? · If you have a choice at the end of your life, where would you prefer to die? At home? In a hospital or nursing home? Somewhere else? · Would you prefer to be buried or cremated? · Do you want your organs to be donated after you die? What should you do with your living will? · Make sure that your family members and your health care agent have copies of your living will (also called a declaration). · Give your doctor a copy of your living will. Ask him or her to keep it as part of your medical record. If you have more than one doctor, make sure that each one has a copy. · Put a copy of your living will where it can be easily found. For example, some people may put a copy on their refrigerator door. If you are using a digital copy, be sure your doctor, family members, and health care agent know how to find and access it. Where can you learn more? Go to https://[email or web address].[email or web address] org and sign in to your [employer] account. Enter J679 in the [employer] box to learn more about \"Learning About Living Wilma Mary. \"     If you do not have an account, please click on the \"Sign Up Now\" link. Current as of: December 9, 2019               Content Version: 12.5  © 9817-1982 Healthwise, Incorporated. Care instructions adapted under license by [facility] ([facility]). If you have questions about a medical condition or this instruction, always ask your healthcare professional. [address] any warranty or liability for your use of this information. Personalized Preventive Plan for Abhi Vaughan - 9/30/2020  Medicare offers a range of preventive health benefits. Some of the tests and screenings are paid in full while other may be subject to a deductible, co-insurance, and/or copay. Some of these benefits include a comprehensive review of your medical history including lifestyle, illnesses that may run in your family, and various assessments and screenings as appropriate. After reviewing your medical record and screening and assessments performed today your provider may have ordered immunizations, labs, imaging, and/or referrals for you. A list of these orders (if applicable) as well as your Preventive Care list are included within your After Visit Summary for your review. Other Preventive Recommendations:    · A preventive eye exam performed by an eye specialist is recommended every 1-2 years to screen for glaucoma; cataracts, macular degeneration, and other eye disorders. · A preventive dental visit is recommended every 6 months. · Try to get at least 150 minutes of exercise per week or 10,000 steps per day on a pedometer . · Order or download the FREE \"Exercise & Physical Activity: Your Everyday Guide\" from [employer]. Call 2-620.549.1656 or search [employer] online. · You need 8824-4771 mg of calcium and 2382-1457 IU of vitamin D per day.  It is possible to meet your calcium requirement with diet alone, but a vitamin D supplement is usually necessary to meet this goal.  · When exposed to the sun, use a sunscreen that protects against both UVA and UVB radiation with an SPF of 30 or greater. Reapply every 2 to 3 hours or after sweating, drying off with a towel, or swimming. · Always wear a seat belt when traveling in a car. Always wear a helmet when riding a bicycle or motorcycle.

## 2020-09-30 NOTE — PROGRESS NOTES
20     [facility]    : 1945 Sex: female   Age: 77 y.o. Chief Complaint   Patient presents with    Gastroesophageal Reflux    Hyperlipidemia       Prior to Admission medications    Medication Sig Start Date End Date Taking? Authorizing Provider   Glucosamine HCl 1500 MG TABS  10/27/12  Yes Historical Provider, MD   albuterol sulfate HFA (PROAIR HFA) 108 (90 Base) MCG/ACT inhaler Inhale into the lungs 18  Yes Historical Provider, MD   Omega-3 Fatty Acids (OMEGA-3 FISH OIL) 1000 MG CAPS Take by mouth 2/15/11  Yes Historical Provider, MD   ibuprofen (ADVIL;MOTRIN) 800 MG tablet Take by mouth 17  Yes Historical Provider, MD   pantoprazole (PROTONIX) 40 MG tablet Take 1 tablet by mouth every morning (before breakfast) 20  Yes Welford Code Jeff,    Glucosamine-Chondroitin (GLUCOSAMINE CHONDR COMPLEX PO) Take 2,000 mg by mouth daily. Historical Provider, MD   therapeutic multivitamin-minerals (THERAGRAN-M) tablet Take 1 tablet by mouth daily. Historical Provider, MD   Calcium Carbonate-Vitamin D (CALCIUM + D) 600-200 MG-UNIT TABS Take  by mouth 2 times daily. Historical Provider, MD          HPI: Patient evaluated gastroesophageal reflux disease malignant malignant neoplasm left breast asthmatic bronchitis hyperlipidemia fatigue. Medically overall is stable. Some complaints of fatigue in the last week. Lab studies to be completed. Systems review otherwise stable. Review of Systems   Constitutional: Positive for fatigue. HENT: Negative. Eyes: Negative. Respiratory: Negative. Gastrointestinal: Negative. Endocrine: Negative. Genitourinary: Negative. Musculoskeletal: Negative. Skin: Negative. Allergic/Immunologic: Negative. Neurological: Negative. Hematological: Negative. Psychiatric/Behavioral: Negative.                Current Outpatient Medications:     Glucosamine HCl 1500 MG TABS, , Disp: , Rfl:     albuterol sulfate HFA (PROAIR HFA) 108 (90 Base) MCG/ACT inhaler, Inhale into the lungs, Disp: , Rfl:     Omega-3 Fatty Acids (OMEGA-3 FISH OIL) 1000 MG CAPS, Take by mouth, Disp: , Rfl:     ibuprofen (ADVIL;MOTRIN) 800 MG tablet, Take by mouth, Disp: , Rfl:     pantoprazole (PROTONIX) 40 MG tablet, Take 1 tablet by mouth every morning (before breakfast), Disp: 90 tablet, Rfl: 1    Glucosamine-Chondroitin (GLUCOSAMINE CHONDR COMPLEX PO), Take 2,000 mg by mouth daily. , Disp: , Rfl:     therapeutic multivitamin-minerals (THERAGRAN-M) tablet, Take 1 tablet by mouth daily. , Disp: , Rfl:     Calcium Carbonate-Vitamin D (CALCIUM + D) 600-200 MG-UNIT TABS, Take  by mouth 2 times daily. , Disp: , Rfl:     No Known Allergies    Social History     Tobacco Use    Smoking status: Former Smoker     Packs/day: 1.00     Years: 10.00     Pack years: 10.00     Last attempt to quit: 2002     Years since quittin.7    Smokeless tobacco: Never Used   Substance Use Topics    Alcohol use: Not on file    Drug use: No      Past Surgical History:   Procedure Laterality Date   Alejandro Gonzalez LUMPECTOMY      Dr. Susanne Jasmine [facility]    COLONOSCOPY N/A 3/8/2019    COLONOSCOPY DIAGNOSTIC performed by Cookie Gr MD at [address] [facility]      Dr. Candis Hanson N/A 3/8/2019    EGD BIOPSY performed by Cookie Gr MD at [address] N/A 7/3/2019    EGD ESOPHAGOGASTRODUODENOSCOPY performed by Cookie Gr MD at [address] [facility] History   Problem Relation Age of Onset    Cancer Mother         Pancreatic and liver    Diabetes Father     Stroke Father     Lupus Sister     Breast Cancer Sister      Past Medical History:   Diagnosis Date    Anemia     Anemia     Asthma     COPD (chronic obstructive [facility] disease) ([facility] [address]     Gastroesophageal reflux     [place] (hard of hearing)     HX: breast cancer     Right breast    Iron deficiency        Vitals:    09/30/20 1317   BP: 124/74   Pulse: 101   Resp: 20   Temp: 97.8 °F (36.6 °C)   Weight: 177 lb (80.3 kg)     BP Readings from Last 3 Encounters:   09/30/20 124/74   09/30/20 124/74   11/19/19 124/72        Physical Exam  Vitals signs and nursing note reviewed. Constitutional:       Appearance: She is well-developed. HENT:      Head: Normocephalic. Right Ear: External ear normal.      Left Ear: External ear normal.      Nose: Nose normal.   Eyes:      Conjunctiva/sclera: Conjunctivae normal.      Pupils: Pupils are equal, round, and reactive to light. Neck:      Musculoskeletal: Normal range of motion and neck supple. Cardiovascular:      Rate and Rhythm: Normal rate. Pulmonary:      Breath sounds: Normal breath sounds. Abdominal:      General: Bowel sounds are normal.      Palpations: Abdomen is soft. Musculoskeletal: Normal range of motion. Skin:     General: Skin is warm and dry. Neurological:      Mental Status: She is alert and oriented to person, place, and time. Psychiatric:         Behavior: Behavior normal.        Today's vitals physical examination stable. We will maintain present meds and care. Follow-up visit 3 months and sooner if problems. Plan Per Assessment:  Shital Ford was seen today for gastroesophageal reflux and hyperlipidemia. Diagnoses and all orders for this visit:    Gastroesophageal reflux disease with esophagitis  -     CBC Auto Differential; Future  -     Comprehensive Metabolic Panel; Future  -     Lipid Panel; Future  -     T4; Future  -     TSH without Reflex; Future    Malignant neoplasm of left breast in female, estrogen receptor positive, unspecified site of breast ([facility] [address]  -     CBC Auto Differential; Future  -     Comprehensive Metabolic Panel; Future  -     Lipid Panel; Future  -     T4; Future  -     TSH without Reflex;  Future    Mild intermittent asthma without complication    HX: breast cancer    Hyperlipidemia, unspecified hyperlipidemia type  -     CBC Auto Differential; Future  -     Comprehensive Metabolic Panel; Future  -     Lipid Panel; Future  -     T4; Future  -     TSH without Reflex; Future    Fatigue, unspecified type  -     CBC Auto Differential; Future  -     Comprehensive Metabolic Panel; Future  -     Lipid Panel; Future  -     T4; Future  -     TSH without Reflex; Future    Other orders  -     pantoprazole (PROTONIX) 40 MG tablet; Take 1 tablet by mouth every morning (before breakfast)            No follow-ups on file. Eliud Interiano DO    Note was generated with the assistance of voice recognition software. Document was reviewed however may contain grammatical errors.

## 2020-10-02 DIAGNOSIS — C50.912 MALIGNANT NEOPLASM OF LEFT BREAST IN FEMALE, ESTROGEN RECEPTOR POSITIVE, UNSPECIFIED SITE OF BREAST (HCC): ICD-10-CM

## 2020-10-02 DIAGNOSIS — R53.83 FATIGUE, UNSPECIFIED TYPE: ICD-10-CM

## 2020-10-02 DIAGNOSIS — K21.00 GASTROESOPHAGEAL REFLUX DISEASE WITH ESOPHAGITIS: ICD-10-CM

## 2020-10-02 DIAGNOSIS — E78.5 HYPERLIPIDEMIA, UNSPECIFIED HYPERLIPIDEMIA TYPE: ICD-10-CM

## 2020-10-02 DIAGNOSIS — Z17.0 MALIGNANT NEOPLASM OF LEFT BREAST IN FEMALE, ESTROGEN RECEPTOR POSITIVE, UNSPECIFIED SITE OF BREAST (HCC): ICD-10-CM

## 2020-10-02 LAB
ALBUMIN SERPL-MCNC: NORMAL G/DL
ALP BLD-CCNC: NORMAL U/L
ALT SERPL-CCNC: NORMAL U/L
ANION GAP SERPL CALCULATED.3IONS-SCNC: NORMAL MMOL/L
AST SERPL-CCNC: NORMAL U/L
BASOPHILS ABSOLUTE: NORMAL
BASOPHILS RELATIVE PERCENT: NORMAL
BILIRUB SERPL-MCNC: NORMAL MG/DL
BUN BLDV-MCNC: NORMAL MG/DL
CALCIUM SERPL-MCNC: NORMAL MG/DL
CHLORIDE BLD-SCNC: NORMAL MMOL/L
CHOLESTEROL, TOTAL: NORMAL
CHOLESTEROL/HDL RATIO: NORMAL
CO2: NORMAL
CREAT SERPL-MCNC: NORMAL MG/DL
EOSINOPHILS ABSOLUTE: NORMAL
EOSINOPHILS RELATIVE PERCENT: NORMAL
GFR CALCULATED: NORMAL
GLUCOSE BLD-MCNC: NORMAL MG/DL
HCT VFR BLD CALC: NORMAL %
HDLC SERPL-MCNC: NORMAL MG/DL
HEMOGLOBIN: NORMAL
LDL CHOLESTEROL CALCULATED: NORMAL
LYMPHOCYTES ABSOLUTE: NORMAL
LYMPHOCYTES RELATIVE PERCENT: NORMAL
MCH RBC QN AUTO: NORMAL PG
MCHC RBC AUTO-ENTMCNC: NORMAL G/DL
MCV RBC AUTO: NORMAL FL
MONOCYTES ABSOLUTE: NORMAL
MONOCYTES RELATIVE PERCENT: NORMAL
NEUTROPHILS ABSOLUTE: NORMAL
NEUTROPHILS RELATIVE PERCENT: NORMAL
NONHDLC SERPL-MCNC: NORMAL MG/DL
PDW BLD-RTO: NORMAL %
PLATELET # BLD: NORMAL 10*3/UL
PMV BLD AUTO: NORMAL FL
POTASSIUM SERPL-SCNC: NORMAL MMOL/L
RBC # BLD: NORMAL 10*6/UL
SODIUM BLD-SCNC: NORMAL MMOL/L
T4 TOTAL: NORMAL
TOTAL PROTEIN: NORMAL
TRIGL SERPL-MCNC: NORMAL MG/DL
TSH SERPL DL<=0.05 MIU/L-ACNC: NORMAL M[IU]/L
VLDLC SERPL CALC-MCNC: NORMAL MG/DL
WBC # BLD: NORMAL 10*3/UL

## 2020-12-29 ENCOUNTER — TELEPHONE (OUTPATIENT)
Dept: ADMINISTRATIVE | Age: 75
End: 2020-12-29

## 2020-12-29 NOTE — TELEPHONE ENCOUNTER
Pt called in and cancelled her appt with Dr Juan Bustamante for tomorrow. She is having sinus congestion,headache, nausea, and body aches. She wanted to speak with the nurse. Call was transferred to [facility] nurse line.

## 2020-12-30 ENCOUNTER — OFFICE VISIT (OUTPATIENT)
Dept: PRIMARY CARE CLINIC | Age: 75
End: 2020-12-30
Payer: MEDICARE

## 2020-12-30 VITALS
RESPIRATION RATE: 18 BRPM | TEMPERATURE: 99 F | WEIGHT: 177 LBS | OXYGEN SATURATION: 98 % | DIASTOLIC BLOOD PRESSURE: 86 MMHG | SYSTOLIC BLOOD PRESSURE: 130 MMHG | HEART RATE: 76 BPM | BODY MASS INDEX: 30.38 KG/M2

## 2020-12-30 DIAGNOSIS — Z20.822 SUSPECTED COVID-19 VIRUS INFECTION: ICD-10-CM

## 2020-12-30 LAB
Lab: NORMAL
QC PASS/FAIL: NORMAL
SARS-COV-2, POC: NORMAL

## 2020-12-30 PROCEDURE — 87426 SARSCOV CORONAVIRUS AG IA: CPT | Performed by: FAMILY MEDICINE

## 2020-12-30 PROCEDURE — 99213 OFFICE O/P EST LOW 20 MIN: CPT | Performed by: FAMILY MEDICINE

## 2020-12-30 RX ORDER — ALBUTEROL SULFATE 90 UG/1
2 AEROSOL, METERED RESPIRATORY (INHALATION) EVERY 4 HOURS PRN
Qty: 1 INHALER | Refills: 1 | Status: SHIPPED
Start: 2020-12-30 | End: 2022-03-16 | Stop reason: SDUPTHER

## 2020-12-30 RX ORDER — DOXYCYCLINE HYCLATE 100 MG
100 TABLET ORAL 2 TIMES DAILY WITH MEALS
Qty: 20 TABLET | Refills: 0 | Status: SHIPPED | OUTPATIENT
Start: 2020-12-30 | End: 2021-01-09

## 2020-12-30 NOTE — PROGRESS NOTES
Chief Complaint   Shortness of Breath (Started 12/26/20), Headache, and Other (Positive exposure)      History of Present Illness   Source of history provided by:  patientAmparo Bourne is a 77 y.o. old female who presents to the flu clinic with complaints of Generalized Body Aches, Headache and Shortness of breath x 6 days. States symptoms have been stable since onset. Has been taking mucinex for the symptoms with some relief. Denies any Fever, Nausea, Vomiting, Abdominal Pain or Rash. Reports being prone to sinus infections. Reports previous history of smoking. ROS   Pertinent positives and negatives are stated within HPI, all other systems reviewed and are negative. Past Medical History:  has a past medical history of Anemia, Anemia, Asthma, COPD (chronic obstructive pulmonary disease) ([address] Gastroesophageal reflux, [place] (hard of hearing), HX: breast cancer, and Iron deficiency. Past Surgical History:  has a past surgical history that includes lymph node dissection (2010); Breast lumpectomy (2010); back surgery (1989); Hysterectomy; Colonoscopy (N/A, 3/8/2019); Upper gastrointestinal endoscopy (N/A, 3/8/2019); and Upper gastrointestinal endoscopy (N/A, 7/3/2019). Social History:  reports that she quit smoking about 19 years ago. She has a 10.00 pack-year smoking history. She has never used smokeless tobacco. She reports that she does not use drugs. Family History: family history includes Breast Cancer in her sister; Cancer in her mother; Diabetes in her father; Lupus in her sister; Stroke in her father. Allergies: Patient has no known allergies. Physical Exam   Vital Signs:  /86   Pulse 76   Temp 99 °F (37.2 °C)   Resp 18   Wt 177 lb (80.3 kg)   SpO2 98%   BMI 30.38 kg/m²    Oxygen Saturation Interpretation: Normal.    Constitutional:  Alert, development consistent with age. NAD. Head:  NC/NT. Airway patent. Ears: TMs normal bilaterally. Canals without exudate or swelling bilaterally. Mouth: Posterior pharynx with mild erythema and clear postnasal drip. no tonsillar hypertrophy or exudate. Neck:  Normal ROM. Supple. shotty anterior cervical adenopathy noted. Lungs: Good flow. EEW right sided. No rales or rhonchi  CV:  Regular rate and rhythm, normal heart sounds, without pathological murmurs, ectopy, gallops, or rubs. Skin:  Normal turgor. Warm, dry, without visible rash. Lymphatic: No lymphangitis or adenopathy noted. Neurological:  Oriented. Motor functions intact. Lab / Imaging Results   (All laboratory and radiology results have been personally reviewed by myself)  Labs:  Results for orders placed or performed in visit on 12/30/20   POCT COVID-19, Antigen   Result Value Ref Range    SARS-COV-2, POC Not-Detected Not Detected    Lot Number [id number]     QC Pass/Fail pass        Imaging: All Radiology results interpreted by Radiologist unless otherwise noted. No results found. Medical Decision Making   Pt non-toxic, in no apparent distress and stable at time of discharge. Assessment/Plan   Dania Morrissey was seen today for shortness of breath, headache and other. Diagnoses and all orders for this visit:    Suspected COVID-19 virus infection  -     POCT COVID-19, Antigen  -     COVID-19 Ambulatory; Future    At high risk for falls    Other orders  -     doxycycline hyclate (VIBRA-TABS) 100 MG tablet; Take 1 tablet by mouth 2 times daily (with meals) for 10 days  -     albuterol sulfate HFA (PROAIR HFA) 108 (90 Base) MCG/ACT inhaler;  Inhale 2 puffs into the lungs every 4 hours as needed for Wheezing or Shortness of Breath Ok substitute COVID-19 swab obtained and pending, will call with results once available. Advised self-quarantine at home in the interim. Increase fluids and rest. Symptomatic relief discussed including Tylenol prn pain/fever. Schedule f/u with PCP in 7-10 days if symptoms persist. ED sooner if symptoms worsen or change. ED immediately with high or refractory fever, progressive SOB, dyspnea, CP, calf pain/swelling, shaking chills, vomiting, abdominal pain, lethargy, flank pain, or decreased urinary output. Pt verbalizes understanding and is in agreement with plan of care. All questions answered. Syed Mcneill MD    This visit was provided as a focused evaluation during the COVID -19 pandemic/national emergency. A comprehensive review of all previous patient history and testing was not conducted. Pertinent findings were elicited during the visit. *NOTE: This report was transcribed using voice recognition software. Every effort was made to ensure accuracy; however, inadvertent computerized transcription errors may be present.

## 2021-01-01 LAB
SARS-COV-2: NOT DETECTED
SOURCE: NORMAL

## 2021-01-25 ENCOUNTER — TELEPHONE (OUTPATIENT)
Dept: PRIMARY CARE CLINIC | Age: 76
End: 2021-01-25

## 2021-04-20 RX ORDER — PANTOPRAZOLE SODIUM 40 MG/1
40 TABLET, DELAYED RELEASE ORAL
Qty: 90 TABLET | Refills: 1 | Status: SHIPPED
Start: 2021-04-20 | End: 2021-10-20

## 2021-06-07 ENCOUNTER — HOSPITAL ENCOUNTER (OUTPATIENT)
Dept: GENERAL RADIOLOGY | Age: 76
Discharge: HOME | End: 2021-06-09
Payer: MEDICARE

## 2021-06-07 DIAGNOSIS — Z12.31 VISIT FOR SCREENING MAMMOGRAM: ICD-10-CM

## 2021-06-07 PROCEDURE — 77063 BREAST TOMOSYNTHESIS BI: CPT

## 2021-09-17 ENCOUNTER — TELEPHONE (OUTPATIENT)
Dept: PRIMARY CARE CLINIC | Age: 76
End: 2021-09-17

## 2021-09-17 DIAGNOSIS — R53.1 WEAKNESS: Primary | ICD-10-CM

## 2021-09-17 DIAGNOSIS — R26.9 GAIT DIFFICULTY: ICD-10-CM

## 2021-09-17 NOTE — TELEPHONE ENCOUNTER
EPHRAIM went and did a demonstration at the facility that the patient is currently staying at. She is requesting that they do OT ^& PT with the patient. You will have to fax an order to [phone]. Since the patient has MED D you will not have to have recent face to face just the insurance card and demographics will need sent.  Diagnosis are weakness and gait instability

## 2021-09-20 NOTE — TELEPHONE ENCOUNTER
Marlyn Spearing calling for referral for PT & OT. They are a rehab/PT organization.  Okay for referral?

## 2021-10-05 ENCOUNTER — TELEPHONE (OUTPATIENT)
Dept: PRIMARY CARE CLINIC | Age: 76
End: 2021-10-05

## 2021-10-05 DIAGNOSIS — D50.8 OTHER IRON DEFICIENCY ANEMIA: ICD-10-CM

## 2021-10-05 DIAGNOSIS — R53.83 FATIGUE, UNSPECIFIED TYPE: ICD-10-CM

## 2021-10-05 DIAGNOSIS — R73.01 IMPAIRED FASTING GLUCOSE: ICD-10-CM

## 2021-10-05 DIAGNOSIS — E78.5 HYPERLIPIDEMIA, UNSPECIFIED HYPERLIPIDEMIA TYPE: Primary | ICD-10-CM

## 2021-10-20 RX ORDER — PANTOPRAZOLE SODIUM 40 MG/1
TABLET, DELAYED RELEASE ORAL
Qty: 90 TABLET | Refills: 1 | Status: SHIPPED
Start: 2021-10-20 | End: 2021-10-28 | Stop reason: SDUPTHER

## 2021-10-23 LAB
ALBUMIN SERPL-MCNC: 4.5 G/DL
ALP BLD-CCNC: 66 U/L
ALT SERPL-CCNC: 19 U/L
ANION GAP SERPL CALCULATED.3IONS-SCNC: NORMAL MMOL/L
AST SERPL-CCNC: 20 U/L
AVERAGE GLUCOSE: NORMAL
BASOPHILS ABSOLUTE: 20 /ΜL
BASOPHILS RELATIVE PERCENT: 0.5 %
BILIRUB SERPL-MCNC: 0.6 MG/DL (ref 0.1–1.4)
BUN BLDV-MCNC: 14 MG/DL
CALCIUM SERPL-MCNC: 9.3 MG/DL
CHLORIDE BLD-SCNC: 105 MMOL/L
CHOLESTEROL, TOTAL: 180 MG/DL
CHOLESTEROL/HDL RATIO: 3.1
CO2: 29 MMOL/L
CREAT SERPL-MCNC: 0.72 MG/DL
EOSINOPHILS ABSOLUTE: 70 /ΜL
EOSINOPHILS RELATIVE PERCENT: 1.8 %
GFR CALCULATED: 82
GLUCOSE BLD-MCNC: 109 MG/DL
HBA1C MFR BLD: 5.8 %
HCT VFR BLD CALC: 45.4 % (ref 36–46)
HDLC SERPL-MCNC: 58 MG/DL (ref 35–70)
HEMOGLOBIN: 15.4 G/DL (ref 12–16)
LDL CHOLESTEROL CALCULATED: 100 MG/DL (ref 0–160)
LYMPHOCYTES ABSOLUTE: 909 /ΜL
LYMPHOCYTES RELATIVE PERCENT: 23.3 %
MCH RBC QN AUTO: 29.9 PG
MCHC RBC AUTO-ENTMCNC: 33.9 G/DL
MCV RBC AUTO: 88.2 FL
MONOCYTES ABSOLUTE: 289 /ΜL
MONOCYTES RELATIVE PERCENT: 7.4 %
NEUTROPHILS ABSOLUTE: 2613 /ΜL
NEUTROPHILS RELATIVE PERCENT: 67 %
NONHDLC SERPL-MCNC: 122 MG/DL
PDW BLD-RTO: 12.9 %
PLATELET # BLD: 249 K/ΜL
PMV BLD AUTO: 8.9 FL
POTASSIUM SERPL-SCNC: 4.4 MMOL/L
RBC # BLD: 5.15 10^6/ΜL
SODIUM BLD-SCNC: 142 MMOL/L
T4 TOTAL: 7.4
TOTAL PROTEIN: 6.9
TRIGL SERPL-MCNC: 127 MG/DL
TSH SERPL DL<=0.05 MIU/L-ACNC: 3.13 UIU/ML
VLDLC SERPL CALC-MCNC: NORMAL MG/DL
WBC # BLD: 3.9 10^3/ML

## 2021-10-26 DIAGNOSIS — E78.5 HYPERLIPIDEMIA, UNSPECIFIED HYPERLIPIDEMIA TYPE: ICD-10-CM

## 2021-10-26 DIAGNOSIS — D50.8 OTHER IRON DEFICIENCY ANEMIA: ICD-10-CM

## 2021-10-26 DIAGNOSIS — R53.83 FATIGUE, UNSPECIFIED TYPE: ICD-10-CM

## 2021-10-26 DIAGNOSIS — R73.01 IMPAIRED FASTING GLUCOSE: ICD-10-CM

## 2021-10-28 ENCOUNTER — OFFICE VISIT (OUTPATIENT)
Dept: PRIMARY CARE CLINIC | Age: 76
End: 2021-10-28
Payer: MEDICARE

## 2021-10-28 VITALS
HEART RATE: 116 BPM | TEMPERATURE: 97.1 F | WEIGHT: 178 LBS | HEIGHT: 64 IN | SYSTOLIC BLOOD PRESSURE: 122 MMHG | DIASTOLIC BLOOD PRESSURE: 86 MMHG | OXYGEN SATURATION: 97 % | BODY MASS INDEX: 30.39 KG/M2

## 2021-10-28 DIAGNOSIS — E78.5 HYPERLIPIDEMIA, UNSPECIFIED HYPERLIPIDEMIA TYPE: ICD-10-CM

## 2021-10-28 DIAGNOSIS — Z23 NEED FOR PROPHYLACTIC VACCINATION AGAINST DIPHTHERIA-TETANUS-PERTUSSIS (DTP): ICD-10-CM

## 2021-10-28 DIAGNOSIS — Z17.0 MALIGNANT NEOPLASM OF LEFT BREAST IN FEMALE, ESTROGEN RECEPTOR POSITIVE, UNSPECIFIED SITE OF BREAST (HCC): ICD-10-CM

## 2021-10-28 DIAGNOSIS — C50.912 MALIGNANT NEOPLASM OF LEFT BREAST IN FEMALE, ESTROGEN RECEPTOR POSITIVE, UNSPECIFIED SITE OF BREAST (HCC): ICD-10-CM

## 2021-10-28 DIAGNOSIS — Z85.3 HX: BREAST CANCER: ICD-10-CM

## 2021-10-28 DIAGNOSIS — Z00.00 ROUTINE GENERAL MEDICAL EXAMINATION AT A HEALTH CARE FACILITY: ICD-10-CM

## 2021-10-28 DIAGNOSIS — Z23 NEED FOR INFLUENZA VACCINATION: ICD-10-CM

## 2021-10-28 DIAGNOSIS — K21.00 GASTROESOPHAGEAL REFLUX DISEASE WITH ESOPHAGITIS WITHOUT HEMORRHAGE: Primary | ICD-10-CM

## 2021-10-28 DIAGNOSIS — Z23 NEED FOR PROPHYLACTIC VACCINATION AND INOCULATION AGAINST VARICELLA: ICD-10-CM

## 2021-10-28 PROCEDURE — G0008 ADMIN INFLUENZA VIRUS VAC: HCPCS | Performed by: FAMILY MEDICINE

## 2021-10-28 PROCEDURE — G0439 PPPS, SUBSEQ VISIT: HCPCS | Performed by: FAMILY MEDICINE

## 2021-10-28 PROCEDURE — 90694 VACC AIIV4 NO PRSRV 0.5ML IM: CPT | Performed by: FAMILY MEDICINE

## 2021-10-28 PROCEDURE — 99214 OFFICE O/P EST MOD 30 MIN: CPT | Performed by: FAMILY MEDICINE

## 2021-10-28 RX ORDER — PANTOPRAZOLE SODIUM 40 MG/1
TABLET, DELAYED RELEASE ORAL
Qty: 90 TABLET | Refills: 1 | Status: SHIPPED
Start: 2021-10-28 | End: 2022-02-21

## 2021-10-28 ASSESSMENT — ENCOUNTER SYMPTOMS
GASTROINTESTINAL NEGATIVE: 1
ALLERGIC/IMMUNOLOGIC NEGATIVE: 1
RESPIRATORY NEGATIVE: 1
EYES NEGATIVE: 1

## 2021-10-28 ASSESSMENT — PATIENT HEALTH QUESTIONNAIRE - PHQ9
SUM OF ALL RESPONSES TO PHQ9 QUESTIONS 1 & 2: 1
1. LITTLE INTEREST OR PLEASURE IN DOING THINGS: 0
SUM OF ALL RESPONSES TO PHQ QUESTIONS 1-9: 1
2. FEELING DOWN, DEPRESSED OR HOPELESS: 1

## 2021-10-28 ASSESSMENT — LIFESTYLE VARIABLES: HOW OFTEN DO YOU HAVE A DRINK CONTAINING ALCOHOL: 0

## 2021-10-28 NOTE — PROGRESS NOTES
10/28/21     Clarissa Marie    : 1945 Sex: female   Age: 77 y.o. Chief Complaint   Patient presents with   [address]       Prior to Admission medications    Medication Sig Start Date End Date Taking? Authorizing Provider   pantoprazole (PROTONIX) 40 MG tablet 1 bid x 30 days then 1 qd 10/28/21  Yes Kamila Jovel DO   Handicap Placard MISC by Does not apply route Duration: 5 years 21  Yes [facility] DO Jeff   albuterol sulfate HFA (PROAIR HFA) 108 (90 Base) MCG/ACT inhaler Inhale 2 puffs into the lungs every 4 hours as needed for Wheezing or Shortness of Breath Ok substitute 20  Yes Iztel Quinn MD   Glucosamine HCl 1500 MG TABS  10/27/12  Yes Historical Provider, MD   Omega-3 Fatty Acids (OMEGA-3 FISH OIL) 1000 MG CAPS Take by mouth 2/15/11  Yes Historical Provider, MD   ibuprofen (ADVIL;MOTRIN) 800 MG tablet Take by mouth 17  Yes Historical Provider, MD   Glucosamine-Chondroitin (GLUCOSAMINE CHONDR COMPLEX PO) Take 2,000 mg by mouth daily. Yes Historical Provider, MD   therapeutic multivitamin-minerals (THERAGRAN-M) tablet Take 1 tablet by mouth daily. Yes Historical Provider, MD   Calcium Carbonate-Vitamin D (CALCIUM + D) 600-200 MG-UNIT TABS Take  by mouth 2 times daily. Yes Historical Provider, MD   Tdap (ADACEL) 5-2-15.5 LF-MCG/0.5 injection Inject 0.5 mLs into the muscle once for 1 dose 10/28/21 10/28/21  Kamila Jovel DO   zoster recombinant adjuvanted vaccine [facility]) 50 MCG/0.5ML SUSR injection Inject 0.5 mLs into the muscle once for 1 dose 10/28/21 10/28/21  Kamila Jovel DO          HPI: Patient seen today in follow-up flu vaccination today. Problems with reflux disease and swallow dysfunction. Difficulty with choking on foods at times. Protonix adjusted to twice a day for a month and then will reassess. Persistent symptoms she does need upper endoscopy and voices understanding. Hyperlipidemia controlled.   Breast cancer Topics    Alcohol use: Not on file    Drug use: No      Past Surgical History:   Procedure Laterality Date   Anjali Zapata LUMPECTOMY  2010    Dr. [facility]al[facility]    COLONOSCOPY N/A 3/8/2019    COLONOSCOPY DIAGNOSTIC performed by Carl Stone MD at [address] [facility]  2010    Dr. Gilda Escamilla N/A 3/8/2019    EGD BIOPSY performed by Carl Stone MD at [address] [facility] N/A 7/3/2019    EGD ESOPHAGOGASTRODUODENOSCOPY performed by Carl Stone MD at [address] [facility] History   Problem Relation Age of Onset    Cancer Mother         Pancreatic and liver    Diabetes Father     Stroke Father     Lupus Sister     Breast Cancer Sister      Past Medical History:   Diagnosis Date    Anemia     Anemia     Asthma     Breast cancer ([facility] [address] 2010    lt breast ca    COPD (chronic obstructive pulmonary disease) ([facility] [address]     Gastroesophageal reflux     [place] (hard of hearing)     HX: breast cancer     Right breast    Iron deficiency        Vitals:    10/28/21 1402   BP: 122/86   Pulse: 116   Temp: 97.1 °F (36.2 °C)   SpO2: 97%   Weight: 178 lb (80.7 kg)   Height: 5' 4\" (1.626 m)     BP Readings from Last 3 Encounters:   10/28/21 122/86   12/30/20 130/86   09/30/20 124/74        Physical Exam  Vitals and nursing note reviewed. Constitutional:       Appearance: She is well-developed. HENT:      Head: Normocephalic. Right Ear: External ear normal.      Left Ear: External ear normal.      Nose: Nose normal.   Eyes:      Conjunctiva/sclera: Conjunctivae normal.      Pupils: Pupils are equal, round, and reactive to light. Cardiovascular:      Rate and Rhythm: Normal rate. Pulmonary:      Breath sounds: Normal breath sounds. Abdominal:      General: Bowel sounds are normal.      Palpations: Abdomen is soft. Musculoskeletal:         General: Normal range of motion. Cervical back: Normal range of motion and neck supple. Skin:     General: Skin is warm and dry. Neurological:      Mental Status: She is alert and oriented to person, place, and time. Psychiatric:         Behavior: Behavior normal.        Present vitals and physical examination stable. I will maintain present meds and care. Reassessment 1 month sooner if problems. Immunizations updated. Plan Per Assessment:  Rei Fox was seen today for discuss labs. Diagnoses and all orders for this visit:    Gastroesophageal reflux disease with esophagitis without hemorrhage    Need for influenza vaccination  -     INFLUENZA, QUADV, ADJUVANTED, 72 YRS =, IM, PF, PREFILL SYR, 0.5ML (FLUAD)    Malignant neoplasm of left breast in female, estrogen receptor positive, unspecified site of breast ([facility] [address]    Hyperlipidemia, unspecified hyperlipidemia type    HX: breast cancer    Other orders  -     pantoprazole (PROTONIX) 40 MG tablet; 1 bid x 30 days then 1 qd            Return in about 1 month (around 11/28/2021). Mar Runner, DO    Note was generated with the assistance of voice recognition software. Document was reviewed however may contain grammatical errors.

## 2021-10-28 NOTE — PROGRESS NOTES
Medicare Annual Wellness Visit  Name: Angelita Fine Date: 10/28/2021   MRN: [id number] Sex: Female   Age: 77 y.o. Ethnicity: Non- / Non    : 1945 Race: White (non-)      Luis Miguel Herrera is here for Medicare AWV    Screenings for behavioral, psychosocial and functional/safety risks, and cognitive dysfunction are all negative except as indicated below. These results, as well as other patient data from the [address] form, are documented in Flowsheets linked to this Encounter. No Known Allergies    Prior to Visit Medications    Medication Sig Taking? Authorizing Provider   pantoprazole (PROTONIX) 40 MG tablet 1 bid x 30 days then 1 qd  Yuan Morse DO   Handicap Placard MISC by Does not apply route Duration: 5 years  Yuan Morse DO   albuterol sulfate HFA (PROAIR HFA) 108 (90 Base) MCG/ACT inhaler Inhale 2 puffs into the lungs every 4 hours as needed for Wheezing or Shortness of Breath Ok substitute  Ej Davis MD   Glucosamine HCl 1500 MG TABS   Historical Provider, MD   Omega-3 Fatty Acids (OMEGA-3 FISH OIL) 1000 MG CAPS Take by mouth  Historical Provider, MD   ibuprofen (ADVIL;MOTRIN) 800 MG tablet Take by mouth  Historical Provider, MD   Glucosamine-Chondroitin (GLUCOSAMINE CHONDR COMPLEX PO) Take 2,000 mg by mouth daily. Historical Provider, MD   therapeutic multivitamin-minerals (THERAGRAN-M) tablet Take 1 tablet by mouth daily. Historical Provider, MD   Calcium Carbonate-Vitamin D (CALCIUM + D) 600-200 MG-UNIT TABS Take  by mouth 2 times daily.   Historical Provider, MD       Past Medical History:   Diagnosis Date    Anemia     Anemia     Asthma     Breast cancer ([address]     lt breast ca    COPD (chronic obstructive pulmonary disease) (HCC)     Gastroesophageal reflux     [place] (hard of hearing)     HX: breast cancer     Right breast    Iron deficiency        Past Surgical History:   Procedure Laterality Date    BACK SURGERY Joan Cushing  2010    Dr. Yoko Vaughan [facility]    COLONOSCOPY N/A 3/8/2019    COLONOSCOPY [facility]AGNOSTIC [facility]  2010    Dr. Norma Handy N/A 3/8/2019    EGD [facility]OPSY [facility] N/A 7/3/2019    EGD ESOPHAGOGASTRODUODENOSCOPY performed by Monica Abraham MD at [place] [address] History   Problem Relation Age of Onset    Cancer Mother         Pancreatic and liver    Diabetes Father     Stroke Father     Lupus Sister     Breast Cancer Sister        CareTeam (Including outside providers/suppliers regularly involved in providing care):   Patient Care Team:  Deniz Yousif DO as PCP - General (Family Medicine)  Deniz Yousif DO as PCP - [facility]    Wt Readings from Last 3 Encounters:   10/28/21 178 lb (80.7 kg)   12/30/20 177 lb (80.3 kg)   09/30/20 177 lb (80.3 kg)     There were no vitals filed for this visit. There is no height or weight on file to calculate BMI. Based upon direct observation of the patient, evaluation of cognition reveals recent and remote memory intact. Patient's complete Health Risk Assessment and screening values have been reviewed and are found in Flowsheets. The following problems were reviewed today and where indicated follow up appointments were made and/or referrals ordered. Positive Risk Factor Screenings with Interventions:          General Health and ACP:  General  In general, how would you say your health is?: Fair  In the past 7 days, have you experienced any of the following?  New or Increased Pain, New or Increased Fatigue, Loneliness, Social Isolation, Stress or Anger?: (!) Loneliness  Do you get the social and emotional support that you need?: (!) No  Do you have a Living Will?: (!) No  Advance Directives     Power of  Living Will ACP-Advance Directive ACP-Power of     Not on File Not on File Not on File Not on File      General Health Risk Interventions:  · Discussed    Health Habits/Nutrition:  Health Habits/Nutrition  Do you exercise for at least 20 minutes 2-3 times per week?: Yes  Have you lost any weight without trying in the past 3 months?: No  Do you eat only one meal per day?: No  Have you seen the dentist within the past year?: N/A - wear dentures     Health Habits/Nutrition Interventions:  · No present complaints. Hearing/Vision:  No exam data present  Hearing/Vision  Do you or your family notice any trouble with your hearing that hasn't been managed with hearing aids?: (!) Yes  Do you have difficulty driving, watching TV, or doing any of your daily activities because of your eyesight?: No  Have you had an eye exam within the past year?: (!) No  Hearing/Vision Interventions:  · Stable at this time.       Personalized Preventive Plan   Current Health Maintenance Status  Immunization History   Administered Date(s) Administered    COVID-19, Schneider Peter, PF, 30mcg/0.3mL 02/26/2021, 03/19/2021, 09/27/2021    Influenza Virus Vaccine 10/10/2013, 09/21/2015, 10/27/2016, 10/17/2017    Influenza, High Dose (Fluzone 65 yrs and older) 09/18/2018    Influenza, Quadv, adjuvanted, 65 yrs +, IM, PF (Fluad) 09/30/2020    Influenza, Triv, inactivated, subunit, adjuvanted, IM (Fluad 65 yrs and older) 11/19/2019    Pneumococcal Conjugate 13-valent ([id number]) 06/16/2017    Pneumococcal Polysaccharide ([id number]) 10/10/2013        Health Maintenance   Topic Date Due    Hepatitis C screen  Never done    DTaP/Tdap/Td vaccine (1 - Tdap) Never done    Shingles Vaccine (1 of 2) Never done    Flu vaccine (1) 09/01/2021    Annual Wellness Visit (AWV)  10/01/2021    A1C test (Diabetic or Prediabetic)  10/22/2022    Lipid screen  10/22/2026    Colon cancer screen colonoscopy 03/08/2029    DEXA (modify frequency per FRAX score)  Completed    Pneumococcal 65+ years Vaccine  Completed    COVID-19 Vaccine  Completed    Hepatitis A vaccine  Aged Out    Hepatitis B vaccine  Aged Out    Hib vaccine  Aged Out    Meningococcal (ACWY) vaccine  Aged Out     Recommendations for Livescribe Due: see orders and patient instructions/AVS.  . Recommended screening schedule for the next 5-10 years is provided to the patient in written form: see Patient Instructions/AVS.    There are no diagnoses linked to this encounter. Cardiovascular Disease Risk Counseling: Assessed the patient's risk to develop cardiovascular disease and reviewed main risk factors. Reviewed steps to reduce disease risk including:   · Quitting tobacco use, reducing amount smoked, or not starting the habit  · Making healthy food choices  · Being physically active and gradualy increasing activity levels   · Reduce weight and determine a healthy BMI goal  · Monitor blood pressure and treat if higher than 140/90 mmHg  · Maintain blood total cholesterol levels under 5 mmol/l or 190 mg/dl  · Maintain LDL cholesterol levels under 3.0 mmol/l or 115 mg/dl   · Control blood glucose levels  · Consider taking aspirin (75 mg daily), once blood pressure is controlled   Provided a follow up plan.   Time spent (minutes):

## 2021-10-28 NOTE — PATIENT INSTRUCTIONS
Advance Directives: Care Instructions  Overview  An advance directive is a legal way to state your wishes at the end of your life. It tells your family and your doctor what to do if you can't say what you want. There are two main types of advance directives. You can change them any time your wishes change. Living will. This form tells your family and your doctor your wishes about life support and other treatment. The form is also called a declaration. Medical power of . This form lets you name a person to make treatment decisions for you when you can't speak for yourself. This person is called a health care agent (health care proxy, health care surrogate). The form is also called a durable power of  for health care. If you do not have an advance directive, decisions about your medical care may be made by a family member, or by a doctor or a  who doesn't know you. It may help to think of an advance directive as a gift to the people who care for you. If you have one, they won't have to make tough decisions by themselves. Follow-up care is a key part of your treatment and safety. Be sure to make and go to all appointments, and call your doctor if you are having problems. It's also a good idea to know your test results and keep a list of the medicines you take. What should you include in an advance directive? Many states have a unique advance directive form. (It may ask you to address specific issues.) Or you might use a universal form that's approved by many states. If your form doesn't tell you what to address, it may be hard to know what to include in your advance directive. Use the questions below to help you get started. · Who do you want to make decisions about your medical care if you are not able to? · What life-support measures do you want if you have a serious illness that gets worse over time or can't be cured? · What are you most afraid of that might happen? (Maybe you're afraid of having pain, losing your independence, or being kept alive by machines.)  · Where would you prefer to die? (Your home? A hospital? A nursing home?)  · Do you want to donate your organs when you die? · Do you want certain Sikhism practices performed before you die? When should you call for help? Be sure to contact your doctor if you have any questions. Where can you learn more? Go to [email or web address] and sign in to your [employer] account. Enter R264 in the [employer] box to learn more about \"Advance Directives: Care Instructions. \"     If you do not have an account, please click on the \"Sign Up Now\" link. Current as of: March 17, 2021               Content Version: 13.0  © 7171-2117 Healthwise, Incorporated. Care instructions adapted under license by [facility] ([facility]). If you have questions about a medical condition or this instruction, always ask your healthcare professional. [place] [address] any warranty or liability for your use of this information. Personalized Preventive Plan for Cally Number - 10/28/2021  Medicare offers a range of preventive health benefits. Some of the tests and screenings are paid in full while other may be subject to a deductible, co-insurance, and/or copay. Some of these benefits include a comprehensive review of your medical history including lifestyle, illnesses that may run in your family, and various assessments and screenings as appropriate. After reviewing your medical record and screening and assessments performed today your provider may have ordered immunizations, labs, imaging, and/or referrals for you. A list of these orders (if applicable) as well as your Preventive Care list are included within your After Visit Summary for your review.     Other Preventive Recommendations:    · A preventive eye exam performed by an eye specialist is recommended every 1-2 years to screen for glaucoma; cataracts, macular degeneration, and other eye disorders. · A preventive dental visit is recommended every 6 months. · Try to get at least 150 minutes of exercise per week or 10,000 steps per day on a pedometer . · Order or download the FREE \"Exercise & Physical Activity: Your Everyday Guide\" from [employer]. Call 0-882.323.8971 or search [employer] online. · You need 8209-9393 mg of calcium and 2617-8134 IU of vitamin D per day. It is possible to meet your calcium requirement with diet alone, but a vitamin D supplement is usually necessary to meet this goal.  · When exposed to the sun, use a sunscreen that protects against both UVA and UVB radiation with an SPF of 30 or greater. Reapply every 2 to 3 hours or after sweating, drying off with a towel, or swimming. · Always wear a seat belt when traveling in a car. Always wear a helmet when riding a bicycle or motorcycle.

## 2021-11-27 PROBLEM — Z23 NEED FOR INFLUENZA VACCINATION: Status: RESOLVED | Noted: 2019-11-19 | Resolved: 2021-11-27

## 2021-12-15 ENCOUNTER — OFFICE VISIT (OUTPATIENT)
Dept: PRIMARY CARE CLINIC | Age: 76
End: 2021-12-15
Payer: MEDICARE

## 2021-12-15 VITALS
HEIGHT: 64 IN | HEART RATE: 90 BPM | DIASTOLIC BLOOD PRESSURE: 80 MMHG | SYSTOLIC BLOOD PRESSURE: 124 MMHG | TEMPERATURE: 98 F | BODY MASS INDEX: 29.53 KG/M2 | WEIGHT: 173 LBS | OXYGEN SATURATION: 97 %

## 2021-12-15 DIAGNOSIS — M19.90 OSTEOARTHRITIS, UNSPECIFIED OSTEOARTHRITIS TYPE, UNSPECIFIED SITE: ICD-10-CM

## 2021-12-15 DIAGNOSIS — J01.00 ACUTE NON-RECURRENT MAXILLARY SINUSITIS: ICD-10-CM

## 2021-12-15 DIAGNOSIS — E78.5 HYPERLIPIDEMIA, UNSPECIFIED HYPERLIPIDEMIA TYPE: ICD-10-CM

## 2021-12-15 DIAGNOSIS — R35.0 URINARY FREQUENCY: Primary | ICD-10-CM

## 2021-12-15 DIAGNOSIS — R19.5 POSITIVE FIT (FECAL IMMUNOCHEMICAL TEST): ICD-10-CM

## 2021-12-15 DIAGNOSIS — R35.0 URINARY FREQUENCY: ICD-10-CM

## 2021-12-15 PROBLEM — R05.9 COUGH: Status: ACTIVE | Noted: 2021-12-15

## 2021-12-15 LAB
BILIRUBIN, POC: NORMAL
BLOOD URINE, POC: NORMAL
CLARITY, POC: NORMAL
COLOR, POC: YELLOW
GLUCOSE URINE, POC: NORMAL
KETONES, POC: NORMAL
LEUKOCYTE EST, POC: NORMAL
NITRITE, POC: POSITIVE
PH, POC: 6.5
PROTEIN, POC: NORMAL
SPECIFIC GRAVITY, POC: 1.02
UROBILINOGEN, POC: 0.2

## 2021-12-15 PROCEDURE — 81002 URINALYSIS NONAUTO W/O SCOPE: CPT | Performed by: FAMILY MEDICINE

## 2021-12-15 PROCEDURE — 99214 OFFICE O/P EST MOD 30 MIN: CPT | Performed by: FAMILY MEDICINE

## 2021-12-15 RX ORDER — CEPHALEXIN 250 MG/1
250 CAPSULE ORAL 2 TIMES DAILY
Qty: 20 CAPSULE | Refills: 0 | Status: SHIPPED
Start: 2021-12-15 | End: 2022-03-16 | Stop reason: CLARIF

## 2021-12-15 NOTE — PROGRESS NOTES
12/15/21     Vignesh Loomis    : 1945 Sex: female   Age: 77 y.o. Chief Complaint   Patient presents with    Gastroesophageal Reflux     notices only when over eating    Congestion     had influenza B        Prior to Admission medications    Medication Sig Start Date End Date Taking? Authorizing Provider   pantoprazole (PROTONIX) 40 MG tablet 1 bid x 30 days then 1 qd 10/28/21  Yes Saul Iglesias DO   Handicap Placard MISC by Does not apply route Duration: 5 years 21  Yes Ami Aguilar DO   albuterol sulfate HFA (PROAIR HFA) 108 (90 Base) MCG/ACT inhaler Inhale 2 puffs into the lungs every 4 hours as needed for Wheezing or Shortness of Breath Ok substitute 20  Yes Arcelia Olivier MD   Glucosamine HCl 1500 MG TABS  10/27/12  Yes Historical Provider, MD   Omega-3 Fatty Acids (OMEGA-3 FISH OIL) 1000 MG CAPS Take by mouth 2/15/11  Yes Historical Provider, MD   ibuprofen (ADVIL;MOTRIN) 800 MG tablet Take by mouth 17  Yes Historical Provider, MD   Glucosamine-Chondroitin (GLUCOSAMINE CHONDR COMPLEX PO) Take 2,000 mg by mouth daily. Yes Historical Provider, MD   therapeutic multivitamin-minerals (THERAGRAN-M) tablet Take 1 tablet by mouth daily. Yes Historical Provider, MD   Calcium Carbonate-Vitamin D (CALCIUM + D) 600-200 MG-UNIT TABS Take  by mouth 2 times daily. Yes Historical Provider, MD   cephALEXin (KEFLEX) 250 MG capsule Take 1 capsule by mouth 2 times daily 12/15/21  Yes Saul Iglesias DO          HPI: Patient evaluated with issues of urinary frequency dysuria and showing evidence of UTI at this point. We will treat with some Keflex and follow-up on culture. Hyperlipidemia stable. Recent URI cough congestion and persistent mild laryngitis. She did respond well to the Keflex and then recommended some Mucinex DM or Robitussin-DM as needed. Recent treatment with Medrol pack and no need for additional meds at this time.   History of receiving a fit test from insurance company that was positive. She had colonoscopy 2 years ago that was negative findings and no need for repeat given her current age. We will repeat the fit test if positive findings are persistent then would possibly pursue further work-up. Review of Systems   Constitutional: Negative. HENT: Positive for congestion. Eyes: Negative. Respiratory: Negative. Gastrointestinal: Negative. Endocrine: Negative. Genitourinary: Positive for frequency and urgency. Musculoskeletal: Negative. Skin: Negative. Allergic/Immunologic: Negative. Neurological: Negative. Hematological: Negative. Psychiatric/Behavioral: Negative. Current Outpatient Medications:     pantoprazole (PROTONIX) 40 MG tablet, 1 bid x 30 days then 1 qd, Disp: 90 tablet, Rfl: 1    Handicap Placard MISC, by Does not apply route Duration: 5 years, Disp: 1 each, Rfl: 0    albuterol sulfate HFA (PROAIR HFA) 108 (90 Base) MCG/ACT inhaler, Inhale 2 puffs into the lungs every 4 hours as needed for Wheezing or Shortness of Breath Ok substitute, Disp: 1 Inhaler, Rfl: 1    Glucosamine HCl 1500 MG TABS, , Disp: , Rfl:     Omega-3 Fatty Acids (OMEGA-3 FISH OIL) 1000 MG CAPS, Take by mouth, Disp: , Rfl:     ibuprofen (ADVIL;MOTRIN) 800 MG tablet, Take by mouth, Disp: , Rfl:     Glucosamine-Chondroitin (GLUCOSAMINE CHONDR COMPLEX PO), Take 2,000 mg by mouth daily. , Disp: , Rfl:     therapeutic multivitamin-minerals (THERAGRAN-M) tablet, Take 1 tablet by mouth daily. , Disp: , Rfl:     Calcium Carbonate-Vitamin D (CALCIUM + D) 600-200 MG-UNIT TABS, Take  by mouth 2 times daily. , Disp: , Rfl:     cephALEXin (KEFLEX) 250 MG capsule, Take 1 capsule by mouth 2 times daily, Disp: 20 capsule, Rfl: 0    No Known Allergies    Social History     Tobacco Use    Smoking status: Former Smoker     Packs/day: 1.00     Years: 10.00     Pack years: 10.00     Quit date: 2002     Years since quittin.9    Smokeless tobacco: Never Used   Substance Use Topics    Alcohol use: Not on file    Drug use: No      Past Surgical History:   Procedure Laterality Date   Maurisio Pio LUMPECTOMY  2010    [facility]. Franc L[facility]    COLONOSCOPY N/A 3/8/2019    COLONOSCOPY DIAGNOSTIC [place] K[place] Velez MD [address]  [place]    Dr. Ernesto Block N/A 3/8/2019    EGD BIOPSY performed by Kyler Velez MD at [facility] N/A 7/3/2019    EGD ESOPHAGOGASTRODUODENOSCOPY performed by Kyler Velez MD at [facility] History   Problem Relation Age of Onset    Cancer Mother         Pancreatic and liver    Diabetes Father     Stroke Father     Lupus Sister     Breast Cancer Sister      Past Medical History:   Diagnosis Date    Anemia     Anemia     Asthma     [facility] 2010    lt breast ca    COPD (chronic obstructive pulmonary disease) ([facility]     Gastroesophageal reflux     [place] (hard of hearing)     HX: breast cancer     Right breast    Iron deficiency        Vitals:    12/15/21 1343   BP: 124/80   Pulse: 90   Temp: 98 °F (36.7 °C)   SpO2: 97%   Weight: 173 lb (78.5 kg)   Height: 5' 4\" (1.626 m)     BP Readings from Last 3 Encounters:   12/15/21 124/80   10/28/21 122/86   12/30/20 130/86        Physical Exam  Vitals and nursing note reviewed. Constitutional:       Appearance: She is well-developed. HENT:      Head: Normocephalic. Right Ear: External ear normal.      Left Ear: External ear normal.      Nose: Nose normal.   Eyes:      Conjunctiva/sclera: Conjunctivae normal.      Pupils: Pupils are equal, round, and reactive to light. Cardiovascular:      Rate and Rhythm: Normal rate. Pulmonary:      Breath sounds: Normal breath sounds.    Abdominal:      General: Bowel sounds are normal. Palpations: Abdomen is soft. Musculoskeletal:         General: Normal range of motion. Cervical back: Normal range of motion and neck supple. Skin:     General: Skin is warm and dry. Neurological:      Mental Status: She is alert and oriented to person, place, and time. Psychiatric:         Behavior: Behavior normal.        Today's vitals physical examination overall stable. Medications to continue as prescribed. Reassessment 3 months and sooner if problems. Fit test that was completed by insurance company was noted. We were not notified of results. I discussed at length with her need for repeat and then will follow-up. Colonoscopy was completed March 2019 normal findings and do not feel there will be a strong need for repeat. Plan Per Assessment:  Donavan Wood was seen today for gastroesophageal reflux and congestion. Diagnoses and all orders for this visit:    Urinary frequency  -     POCT Urinalysis no Micro  -     Culture, Urine; Future    Acute non-recurrent maxillary sinusitis    Osteoarthritis, unspecified osteoarthritis type, unspecified site    Hyperlipidemia, unspecified hyperlipidemia type    Other orders  -     cephALEXin (KEFLEX) 250 MG capsule;  Take 1 capsule by mouth 2 times daily      Lab Results   Component Value Date    TSH 3.13 10/22/2021    TSH 3.66 11/14/2019    S8FMINZ 128.90 06/22/2015    S4LCFBX 7.4 10/22/2021    S4YOKUP 7.1 01/21/2016     Lab Results   Component Value Date    CHOL 180 10/22/2021    CHOL 177 11/14/2019     Lab Results   Component Value Date    TRIG 127 10/22/2021    TRIG 113 11/14/2019     Lab Results   Component Value Date    HDL 58 10/22/2021    HDL 58 11/14/2019     No results found for: ROMARIO [facility]  Lab Results   Component Value Date    LABVLDL 14 01/21/2016    LABVLDL 14 06/22/2015     Lab Results   Component Value Date    CHOLHDLRATIO 3.1 10/22/2021    CHOLHDLRATIO 3.1 11/14/2019     Lab Results   Component Value Date    WBC 3.9 10/22/2021 HGB 15.4 10/22/2021    HCT 45.4 10/22/2021    MCV 88.2 10/22/2021     10/22/2021    LYMPHOPCT 23.3 10/22/2021    RBC 5.15 10/22/2021    MCH 29.9 10/22/2021    MCHC 33.9 10/22/2021    RDW 12.9 10/22/2021     Lab Results   Component Value Date     10/22/2021    K 4.4 10/22/2021     10/22/2021    CO2 29 10/22/2021    BUN 14 10/22/2021    CREATININE 0.72 10/22/2021    GLUCOSE 109 10/22/2021    CALCIUM 9.3 10/22/2021    PROT 7.2 08/15/2019    LABALBU 4.5 10/22/2021    BILITOT 0.6 10/22/2021    ALKPHOS 66 10/22/2021    AST 20 10/22/2021    ALT 19 10/22/2021    LABGLOM 82 10/22/2021    GFRAA >60 08/15/2019      No results found for: PSA, PSADIA   Lab Results   Component Value Date    LABA1C 5.8 10/22/2021     No results found for: EAG       No follow-ups on file. Lorenzo Grier DO    Note was generated with the assistance of voice recognition software. Document was reviewed however may contain grammatical errors.

## 2021-12-18 LAB
ORGANISM: ABNORMAL
URINE CULTURE, ROUTINE: ABNORMAL

## 2022-01-14 PROBLEM — R05.9 COUGH: Status: RESOLVED | Noted: 2021-12-15 | Resolved: 2022-01-14

## 2022-02-21 RX ORDER — PANTOPRAZOLE SODIUM 40 MG/1
TABLET, DELAYED RELEASE ORAL
Qty: 90 TABLET | Refills: 1 | Status: SHIPPED
Start: 2022-02-21 | End: 2022-03-16 | Stop reason: SDUPTHER

## 2022-03-16 ENCOUNTER — OFFICE VISIT (OUTPATIENT)
Dept: PRIMARY CARE CLINIC | Age: 77
End: 2022-03-16
Payer: MEDICARE

## 2022-03-16 VITALS
DIASTOLIC BLOOD PRESSURE: 84 MMHG | WEIGHT: 180 LBS | OXYGEN SATURATION: 95 % | HEART RATE: 74 BPM | SYSTOLIC BLOOD PRESSURE: 122 MMHG | TEMPERATURE: 98.7 F | HEIGHT: 64 IN | BODY MASS INDEX: 30.73 KG/M2

## 2022-03-16 DIAGNOSIS — K21.00 GASTROESOPHAGEAL REFLUX DISEASE WITH ESOPHAGITIS WITHOUT HEMORRHAGE: ICD-10-CM

## 2022-03-16 DIAGNOSIS — R35.0 URINARY FREQUENCY: ICD-10-CM

## 2022-03-16 DIAGNOSIS — E78.5 HYPERLIPIDEMIA, UNSPECIFIED HYPERLIPIDEMIA TYPE: ICD-10-CM

## 2022-03-16 DIAGNOSIS — R35.0 URINARY FREQUENCY: Primary | ICD-10-CM

## 2022-03-16 DIAGNOSIS — R19.5 POSITIVE FIT (FECAL IMMUNOCHEMICAL TEST): ICD-10-CM

## 2022-03-16 LAB
BILIRUBIN, POC: NORMAL
BLOOD URINE, POC: NORMAL
CLARITY, POC: CLEAR
COLOR, POC: YELLOW
CONTROL: NORMAL
GLUCOSE URINE, POC: NORMAL
HEMOCCULT STL QL: POSITIVE
KETONES, POC: NORMAL
LEUKOCYTE EST, POC: NORMAL
NITRITE, POC: NORMAL
PH, POC: 6
PROTEIN, POC: NORMAL
SPECIFIC GRAVITY, POC: 1.01
UROBILINOGEN, POC: 0.2

## 2022-03-16 PROCEDURE — 99214 OFFICE O/P EST MOD 30 MIN: CPT | Performed by: FAMILY MEDICINE

## 2022-03-16 PROCEDURE — 82274 ASSAY TEST FOR BLOOD FECAL: CPT | Performed by: FAMILY MEDICINE

## 2022-03-16 PROCEDURE — 81002 URINALYSIS NONAUTO W/O SCOPE: CPT | Performed by: FAMILY MEDICINE

## 2022-03-16 RX ORDER — PANTOPRAZOLE SODIUM 40 MG/1
TABLET, DELAYED RELEASE ORAL
Qty: 90 TABLET | Refills: 1 | Status: SHIPPED
Start: 2022-03-16 | End: 2022-09-02 | Stop reason: SDUPTHER

## 2022-03-16 RX ORDER — ALBUTEROL SULFATE 90 UG/1
AEROSOL, METERED RESPIRATORY (INHALATION)
Qty: 1 EACH | Refills: 3 | Status: SHIPPED | OUTPATIENT
Start: 2022-03-16

## 2022-03-16 RX ORDER — SULFAMETHOXAZOLE AND TRIMETHOPRIM 800; 160 MG/1; MG/1
1 TABLET ORAL 2 TIMES DAILY
Qty: 14 TABLET | Refills: 0 | Status: SHIPPED | OUTPATIENT
Start: 2022-03-16 | End: 2022-03-23

## 2022-03-16 RX ORDER — SULFAMETHOXAZOLE AND TRIMETHOPRIM 800; 160 MG/1; MG/1
1 TABLET ORAL 2 TIMES DAILY
Qty: 14 TABLET | Refills: 0 | Status: SHIPPED | OUTPATIENT
Start: 2022-03-16 | End: 2022-03-16 | Stop reason: SDUPTHER

## 2022-03-16 ASSESSMENT — ENCOUNTER SYMPTOMS
GASTROINTESTINAL NEGATIVE: 1
RESPIRATORY NEGATIVE: 1
ALLERGIC/IMMUNOLOGIC NEGATIVE: 1
EYES NEGATIVE: 1

## 2022-03-16 NOTE — PROGRESS NOTES
3/16/22     Trish Angulo    : 1945 Sex: female   Age: 77 y.o. Chief Complaint   Patient presents with    Gastroesophageal Reflux    Urinary Tract Infection     states never went away       Prior to Admission medications    Medication Sig Start Date End Date Taking? Authorizing Provider   pantoprazole (PROTONIX) 40 MG tablet TAKE 1 TABLET BY MOUTH TWICE DAILY FOR 30 DAYS THEN TAKE 1 TABLET BY MOUTH EVERY DAY 3/16/22  Yes Alicia Aguilar DO   albuterol sulfate HFA (PROAIR HFA) 108 (90 Base) MCG/ACT inhaler Ok substitute 3/16/22  Yes Alicia Aguilar DO   sulfamethoxazole-trimethoprim (BACTRIM DS;SEPTRA DS) 800-160 MG per tablet Take 1 tablet by mouth 2 times daily for 7 days 3/16/22 3/23/22 Yes Ish Gamble DO   Handicap Placard MISC by Does not apply route Duration: 5 years 21  Yes Alicia Aguilar DO   Glucosamine HCl 1500 MG TABS  10/27/12  Yes Historical Provider, MD   Omega-3 Fatty Acids (OMEGA-3 FISH OIL) 1000 MG CAPS Take by mouth 2/15/11  Yes Historical Provider, MD   ibuprofen (ADVIL;MOTRIN) 800 MG tablet Take by mouth 17  Yes Historical Provider, MD   therapeutic multivitamin-minerals (THERAGRAN-M) tablet Take 1 tablet by mouth daily. Yes Historical Provider, MD   Calcium Carbonate-Vitamin D (CALCIUM + D) 600-200 MG-UNIT TABS Take  by mouth 2 times daily. Yes Historical Provider, MD          HPI: Patient evaluated today questionable positive fit test we are going to repeat. Colonoscopy completed in the last 5 years was normal and do not feel strong need for repeat at this time but we will recheck the fit test and then give further recommendations. Currently asymptomatic. Reflux disease has been stable with Protonix will continue. Hyperlipidemia stable. Urinary tract symptoms frequency urgency and urine is showing blood leukocytes. Placed on Bactrim today and will follow-up culture. Review of Systems   Constitutional: Negative. HENT: Negative. Eyes: Negative. Respiratory: Negative. Gastrointestinal: Negative. Endocrine: Negative. Genitourinary: Positive for frequency. Musculoskeletal: Negative. Skin: Negative. Allergic/Immunologic: Negative. Neurological: Negative. Hematological: Negative. Psychiatric/Behavioral: Negative. Current Outpatient Medications:     pantoprazole (PROTONIX) 40 MG tablet, TAKE 1 TABLET BY MOUTH TWICE DAILY FOR 30 DAYS THEN TAKE 1 TABLET BY MOUTH EVERY DAY, Disp: 90 tablet, Rfl: 1    albuterol sulfate HFA (PROAIR HFA) 108 (90 Base) MCG/ACT inhaler, Ok substitute, Disp: 1 each, Rfl: 3    sulfamethoxazole-trimethoprim (BACTRIM DS;SEPTRA DS) 800-160 MG per tablet, Take 1 tablet by mouth 2 times daily for 7 days, Disp: 14 tablet, Rfl: 0    Handicap Placard MISC, by Does not apply route Duration: 5 years, Disp: 1 each, Rfl: 0    Glucosamine HCl 1500 MG TABS, , Disp: , Rfl:     Omega-3 Fatty Acids (OMEGA-3 FISH OIL) 1000 MG CAPS, Take by mouth, Disp: , Rfl:     ibuprofen (ADVIL;MOTRIN) 800 MG tablet, Take by mouth, Disp: , Rfl:     therapeutic multivitamin-minerals (THERAGRAN-M) tablet, Take 1 tablet by mouth daily. , Disp: , Rfl:     Calcium Carbonate-Vitamin D (CALCIUM + D) 600-200 MG-UNIT TABS, Take  by mouth 2 times daily. , Disp: , Rfl:     No Known Allergies    Social History     Tobacco Use    Smoking status: Former Smoker     Packs/day: 1.00     Years: 10.00     Pack years: 10.00     Quit date: 2002     Years since quittin.2    Smokeless tobacco: Never Used   Substance Use Topics    Alcohol use: Not on file    Drug use: No      Past Surgical History:   Procedure Laterality Date   [facility] L[facility]      Dr. Eri Hart[facility]    COLONOSCOPY N/A 3/8/2019    COLONOSCOPY DIAGNOSTIC performed by Paris Cardenas MD at [facility]      [facility] Eri Hartley [facility]    UPPER GASTROINTESTINAL ENDOSCOPY N/A 3/8/2019    EGD BIOPSY performed by Miriam Street MD at [address] [facility] N/A 7/3/2019    EGD ESOPHAGOGASTRODUODENOSCOPY performed by Miriam Street MD at [facility] History   Problem Relation Age of Onset    Cancer Mother         Pancreatic and liver    Diabetes Father     Stroke Father     Lupus Sister     Breast Cancer Sister      Past Medical History:   Diagnosis Date    Anemia     Anemia     Asthma     Breast [facility] 2010    lt breast ca    COPD (chronic obstructive pulmonary disease) ([facility]     Gastroesophageal reflux     [place] (hard of hearing)     HX: breast cancer     Right breast    Iron deficiency        Vitals:    03/16/22 1412   BP: 122/84   Pulse: 74   Temp: 98.7 °F (37.1 °C)   SpO2: 95%   Weight: 180 lb (81.6 kg)   Height: 5' 4\" (1.626 m)     BP Readings from Last 3 Encounters:   03/16/22 122/84   12/15/21 124/80   10/28/21 122/86        Physical Exam  Vitals and nursing note reviewed. Constitutional:       Appearance: She is well-developed. HENT:      Head: Normocephalic. Right Ear: External ear normal.      Left Ear: External ear normal.      Nose: Nose normal.   Eyes:      Conjunctiva/sclera: Conjunctivae normal.      Pupils: Pupils are equal, round, and reactive to light. Cardiovascular:      Rate and Rhythm: Normal rate. Pulmonary:      Breath sounds: Normal breath sounds. Abdominal:      General: Bowel sounds are normal.      Palpations: Abdomen is soft. Musculoskeletal:         General: Normal range of motion. Cervical back: Normal range of motion and neck supple. Skin:     General: Skin is warm and dry. Neurological:      Mental Status: She is alert and oriented to person, place, and time. Psychiatric:         Behavior: Behavior normal.        Today's vitals physical examination all stable. Heart is controlled lungs are clear.   Medications as

## 2022-03-19 LAB
ORGANISM: ABNORMAL
URINE CULTURE, ROUTINE: ABNORMAL

## 2022-04-09 LAB
ALBUMIN SERPL-MCNC: 4.7 G/DL
ALP BLD-CCNC: 69 U/L
ALT SERPL-CCNC: 20 U/L
ANION GAP SERPL CALCULATED.3IONS-SCNC: NORMAL MMOL/L
AST SERPL-CCNC: 25 U/L
BASOPHILS ABSOLUTE: 30 /ΜL
BASOPHILS RELATIVE PERCENT: 0.7 %
BILIRUB SERPL-MCNC: 0.8 MG/DL (ref 0.1–1.4)
BUN BLDV-MCNC: 13 MG/DL
CALCIUM SERPL-MCNC: 9.6 MG/DL
CHLORIDE BLD-SCNC: 101 MMOL/L
CHOLESTEROL, TOTAL: 215 MG/DL
CHOLESTEROL/HDL RATIO: 3.2
CO2: 30 MMOL/L
CREAT SERPL-MCNC: 0.77 MG/DL
EOSINOPHILS ABSOLUTE: 90 /ΜL
EOSINOPHILS RELATIVE PERCENT: 2.1 %
GFR CALCULATED: 76
GLUCOSE BLD-MCNC: 107 MG/DL
HCT VFR BLD CALC: 48.3 % (ref 36–46)
HDLC SERPL-MCNC: 67 MG/DL (ref 35–70)
HEMOGLOBIN: 16.3 G/DL (ref 12–16)
LDL CHOLESTEROL CALCULATED: 128 MG/DL (ref 0–160)
LYMPHOCYTES ABSOLUTE: 1079 /ΜL
LYMPHOCYTES RELATIVE PERCENT: 25.1 %
MCH RBC QN AUTO: 29.1 PG
MCHC RBC AUTO-ENTMCNC: 33.7 G/DL
MCV RBC AUTO: 86.3 FL
MONOCYTES ABSOLUTE: 348 /ΜL
MONOCYTES RELATIVE PERCENT: 8.1 %
NEUTROPHILS ABSOLUTE: 2752 /ΜL
NEUTROPHILS RELATIVE PERCENT: 64 %
NONHDLC SERPL-MCNC: 148 MG/DL
PDW BLD-RTO: 12.8 %
PLATELET # BLD: 253 K/ΜL
PMV BLD AUTO: 9.3 FL
POTASSIUM SERPL-SCNC: 4.3 MMOL/L
RBC # BLD: 5.6 10^6/ΜL
SODIUM BLD-SCNC: 139 MMOL/L
T4 TOTAL: 7.3
TOTAL PROTEIN: 7
TRIGL SERPL-MCNC: 101 MG/DL
TSH SERPL DL<=0.05 MIU/L-ACNC: 4.71 UIU/ML
VLDLC SERPL CALC-MCNC: NORMAL MG/DL
WBC # BLD: 4.3 10^3/ML

## 2022-04-11 DIAGNOSIS — K21.00 GASTROESOPHAGEAL REFLUX DISEASE WITH ESOPHAGITIS WITHOUT HEMORRHAGE: ICD-10-CM

## 2022-04-11 DIAGNOSIS — R35.0 URINARY FREQUENCY: ICD-10-CM

## 2022-04-11 DIAGNOSIS — E78.5 HYPERLIPIDEMIA, UNSPECIFIED HYPERLIPIDEMIA TYPE: ICD-10-CM

## 2022-04-13 ENCOUNTER — TELEPHONE (OUTPATIENT)
Dept: PRIMARY CARE CLINIC | Age: 77
End: 2022-04-13

## 2022-04-13 ENCOUNTER — OFFICE VISIT (OUTPATIENT)
Dept: PRIMARY CARE CLINIC | Age: 77
End: 2022-04-13
Payer: MEDICARE

## 2022-04-13 VITALS
SYSTOLIC BLOOD PRESSURE: 124 MMHG | DIASTOLIC BLOOD PRESSURE: 64 MMHG | BODY MASS INDEX: 30.9 KG/M2 | HEART RATE: 85 BPM | TEMPERATURE: 97.6 F | HEIGHT: 64 IN | OXYGEN SATURATION: 97 % | WEIGHT: 181 LBS

## 2022-04-13 DIAGNOSIS — M54.9 DORSAL BACK PAIN: ICD-10-CM

## 2022-04-13 DIAGNOSIS — R19.5 POSITIVE FIT (FECAL IMMUNOCHEMICAL TEST): ICD-10-CM

## 2022-04-13 DIAGNOSIS — K21.00 GASTROESOPHAGEAL REFLUX DISEASE WITH ESOPHAGITIS WITHOUT HEMORRHAGE: ICD-10-CM

## 2022-04-13 DIAGNOSIS — E78.5 HYPERLIPIDEMIA, UNSPECIFIED HYPERLIPIDEMIA TYPE: ICD-10-CM

## 2022-04-13 DIAGNOSIS — R73.01 IMPAIRED FASTING GLUCOSE: ICD-10-CM

## 2022-04-13 DIAGNOSIS — Z85.3 HX: BREAST CANCER: ICD-10-CM

## 2022-04-13 DIAGNOSIS — M19.90 OSTEOARTHRITIS, UNSPECIFIED OSTEOARTHRITIS TYPE, UNSPECIFIED SITE: Primary | ICD-10-CM

## 2022-04-13 LAB
CONTROL: NORMAL
HEMOCCULT STL QL: POSITIVE

## 2022-04-13 PROCEDURE — 82274 ASSAY TEST FOR BLOOD FECAL: CPT | Performed by: FAMILY MEDICINE

## 2022-04-13 PROCEDURE — 99214 OFFICE O/P EST MOD 30 MIN: CPT | Performed by: FAMILY MEDICINE

## 2022-04-13 SDOH — ECONOMIC STABILITY: FOOD INSECURITY: WITHIN THE PAST 12 MONTHS, YOU WORRIED THAT YOUR FOOD WOULD RUN OUT BEFORE YOU GOT MONEY TO BUY MORE.: NEVER TRUE

## 2022-04-13 SDOH — ECONOMIC STABILITY: FOOD INSECURITY: WITHIN THE PAST 12 MONTHS, THE FOOD YOU BOUGHT JUST DIDN'T LAST AND YOU DIDN'T HAVE MONEY TO GET MORE.: NEVER TRUE

## 2022-04-13 ASSESSMENT — ENCOUNTER SYMPTOMS
GASTROINTESTINAL NEGATIVE: 1
EYES NEGATIVE: 1
ALLERGIC/IMMUNOLOGIC NEGATIVE: 1
RESPIRATORY NEGATIVE: 1

## 2022-04-13 ASSESSMENT — SOCIAL DETERMINANTS OF HEALTH (SDOH): HOW HARD IS IT FOR YOU TO PAY FOR THE VERY BASICS LIKE FOOD, HOUSING, MEDICAL CARE, AND HEATING?: NOT HARD AT ALL

## 2022-04-13 NOTE — TELEPHONE ENCOUNTER
Left message for pt. Her FIT test was again positive. Per tjt he is not concerned because she had colonscopy in 2019 we will just repeat next time.

## 2022-04-13 NOTE — PROGRESS NOTES
22     Birgit Blount    : 1945 Sex: female   Age: 77 y.o. Chief Complaint   Patient presents with   [address]       Prior to Admission medications    Medication Sig Start Date End Date Taking? Authorizing Provider   pantoprazole (PROTONIX) 40 MG tablet TAKE 1 TABLET BY MOUTH TWICE DAILY FOR 30 DAYS THEN TAKE 1 TABLET BY MOUTH EVERY DAY 3/16/22  Yes Mirella Aguilar DO   albuterol sulfate HFA (PROAIR HFA) 108 (90 Base) MCG/ACT inhaler Ok substitute 3/16/22  Yes Ashby Severance, DO   Handicap Placard MISC by Does not apply route Duration: 5 years 21  Yes Mirella Aguilar DO   Glucosamine HCl 1500 MG TABS  10/27/12  Yes Historical Provider, MD   Omega-3 Fatty Acids (OMEGA-3 FISH OIL) 1000 MG CAPS Take by mouth 2/15/11  Yes Historical Provider, MD   ibuprofen (ADVIL;MOTRIN) 800 MG tablet Take by mouth 17  Yes Historical Provider, MD   therapeutic multivitamin-minerals (THERAGRAN-M) tablet Take 1 tablet by mouth daily. Yes Historical Provider, MD   Calcium Carbonate-Vitamin D (CALCIUM + D) 600-200 MG-UNIT TABS Take  by mouth 2 times daily. Yes Historical Provider, MD          HPI: Patient evaluated today follow-up on fit test which was negative and reviewed with her today. History of breast cancer has been stable. Some thoracic back pain this week that is all resolved as of today so do not feel strong need for further work-up. If recurrent pain would recommend evaluation and x-rays if needed. Hyperlipidemia stable. Reflux disease stable. Impaired fasting glucose diet exercise and monitoring of the labs. Medically otherwise stable. Review of Systems   Constitutional: Negative. HENT: Negative. Eyes: Negative. Respiratory: Negative. Gastrointestinal: Negative. Endocrine: Negative. Genitourinary: Negative. Musculoskeletal: Negative. Skin: Negative. Allergic/Immunologic: Negative. Neurological: Negative. Hematological: Negative. Psychiatric/Behavioral: Negative. Back pain earlier this week now resolved. Current Outpatient Medications:     pantoprazole (PROTONIX) 40 MG tablet, TAKE 1 TABLET BY MOUTH TWICE DAILY FOR 30 DAYS THEN TAKE 1 TABLET BY MOUTH EVERY DAY, Disp: 90 tablet, Rfl: 1    albuterol sulfate HFA (PROAIR HFA) 108 (90 Base) MCG/ACT inhaler, Ok substitute, Disp: 1 each, Rfl: 3    Handicap Placard MISC, by Does not apply route Duration: 5 years, Disp: 1 each, Rfl: 0    Glucosamine HCl 1500 MG TABS, , Disp: , Rfl:     Omega-3 Fatty Acids (OMEGA-3 FISH OIL) 1000 MG CAPS, Take by mouth, Disp: , Rfl:     ibuprofen (ADVIL;MOTRIN) 800 MG tablet, Take by mouth, Disp: , Rfl:     therapeutic multivitamin-minerals (THERAGRAN-M) tablet, Take 1 tablet by mouth daily. , Disp: , Rfl:     Calcium Carbonate-Vitamin D (CALCIUM + D) 600-200 MG-UNIT TABS, Take  by mouth 2 times daily. , Disp: , Rfl:     No Known Allergies    Social History     Tobacco Use    Smoking status: Former Smoker     Packs/day: 1.00     Years: 10.00     Pack years: 10.00     Quit date: 2002     Years since quittin.2    Smokeless tobacco: Never Used   Substance Use Topics    Alcohol use: Not on file    Drug use: No      Past Surgical History:   Procedure Laterality Date   [facility] LUMPECTOMY      Dr. Rom Fernandez [facility]    COLONOSCOPY N/A 3/8/2019    COLONOSCOPY DIAGNOSTIC performed by Zuleyma Sandy MD at [address] [facility]      Dr. Chrissie Salamanca ENDOSCOPY N/A 3/8/2019    EGD BIOPSY performed by Zuleyma Sandy MD at [address] [place] [address] N/A 7/3/2019    EGD ESOPHAGOGASTRODUODENOSCOPY performed by Zuleyma Sandy MD at [facility]     Family History   Problem Relation Age of Onset    Cancer Mother         Pancreatic and liver    Diabetes Father     Stroke Father reassess fit test if this is indeed positive then discuss follow-up sooner with gastroenterology. Currently asymptomatic. Lab Results   Component Value Date    TSH 4.71 04/08/2022    TSH 3.13 10/22/2021    V3VPCPC 128.90 06/22/2015    Q4WRIZX 7.3 04/08/2022    Q6NSZPD 7.4 10/22/2021     Lab Results   Component Value Date    CHOL 215 04/08/2022    CHOL 180 10/22/2021     Lab Results   Component Value Date    TRIG 101 04/08/2022    TRIG 127 10/22/2021     Lab Results   Component Value Date    HDL 67 04/08/2022    HDL 58 10/22/2021     No results found for: [facility]  Lab Results   Component Value Date    LABVLDL 14 01/21/2016    LABVLDL 14 06/22/2015     Lab Results   Component Value Date    CHOLHDLRATIO 3.2 04/08/2022    CHOLHDLRATIO 3.1 10/22/2021     Lab Results   Component Value Date    WBC 4.3 04/08/2022    HGB 16.3 (A) 04/08/2022    HCT 48.3 (A) 04/08/2022    MCV 86.3 04/08/2022     04/08/2022    LYMPHOPCT 25.1 04/08/2022    RBC 5.60 04/08/2022    MCH 29.1 04/08/2022    MCHC 33.7 04/08/2022    RDW 12.8 04/08/2022     Lab Results   Component Value Date     04/08/2022    K 4.3 04/08/2022     04/08/2022    CO2 30 04/08/2022    BUN 13 04/08/2022    CREATININE 0.77 04/08/2022    GLUCOSE 107 04/08/2022    CALCIUM 9.6 04/08/2022    PROT 7.2 08/15/2019    LABALBU 4.7 04/08/2022    BILITOT 0.8 04/08/2022    ALKPHOS 69 04/08/2022    AST 25 04/08/2022    ALT 20 04/08/2022    LABGLOM 76 04/08/2022    GFRAA >60 08/15/2019      No results found for: PSA, PSADIA   Lab Results   Component Value Date    LABA1C 5.8 10/22/2021     No results found for: EAG   Plan Per Assessment:  Adolph Lesches was seen today for discuss labs.     Diagnoses and all orders for this visit:    Osteoarthritis, unspecified osteoarthritis type, unspecified site    Positive FIT (fecal immunochemical test)  -     POCT Fit Test; Future  -     POCT Fit Test    HX: breast cancer    Hyperlipidemia, unspecified hyperlipidemia type  -     CBC with Auto Differential; Future  -     Comprehensive Metabolic Panel; Future  -     Hemoglobin A1C; Future    Gastroesophageal reflux disease with esophagitis without hemorrhage    Impaired fasting glucose  -     CBC with Auto Differential; Future  -     Comprehensive Metabolic Panel; Future  -     Hemoglobin A1C; Future    Dorsal back pain            Return in about 3 months (around 7/13/2022). Bernice Camara DO    Note was generated with the assistance of voice recognition software. Document was reviewed however may contain grammatical errors.

## 2022-05-02 ENCOUNTER — OFFICE VISIT (OUTPATIENT)
Dept: FAMILY MEDICINE CLINIC | Age: 77
End: 2022-05-02
Payer: MEDICARE

## 2022-05-02 VITALS
BODY MASS INDEX: 31.14 KG/M2 | WEIGHT: 181.4 LBS | SYSTOLIC BLOOD PRESSURE: 120 MMHG | TEMPERATURE: 97.2 F | DIASTOLIC BLOOD PRESSURE: 70 MMHG | HEART RATE: 97 BPM | OXYGEN SATURATION: 99 %

## 2022-05-02 DIAGNOSIS — R05.9 COUGH: ICD-10-CM

## 2022-05-02 DIAGNOSIS — J20.9 ACUTE BRONCHITIS, UNSPECIFIED ORGANISM: Primary | ICD-10-CM

## 2022-05-02 LAB
INFLUENZA A ANTIBODY: NEGATIVE
INFLUENZA B ANTIBODY: NEGATIVE
Lab: NORMAL
QC PASS/FAIL: NORMAL
SARS-COV-2 RDRP RESP QL NAA+PROBE: NEGATIVE

## 2022-05-02 PROCEDURE — 87635 SARS-COV-2 COVID-19 AMP PRB: CPT | Performed by: PHYSICIAN ASSISTANT

## 2022-05-02 PROCEDURE — 87804 INFLUENZA ASSAY W/OPTIC: CPT | Performed by: PHYSICIAN ASSISTANT

## 2022-05-02 PROCEDURE — 3006F CXR DOC REV: CPT | Performed by: PHYSICIAN ASSISTANT

## 2022-05-02 PROCEDURE — 99214 OFFICE O/P EST MOD 30 MIN: CPT | Performed by: PHYSICIAN ASSISTANT

## 2022-05-02 RX ORDER — METHYLPREDNISOLONE 4 MG/1
TABLET ORAL
Qty: 1 KIT | Refills: 0 | Status: SHIPPED
Start: 2022-05-02 | End: 2022-06-20

## 2022-05-02 RX ORDER — DOXYCYCLINE HYCLATE 100 MG
100 TABLET ORAL 2 TIMES DAILY
Qty: 20 TABLET | Refills: 0 | Status: SHIPPED | OUTPATIENT
Start: 2022-05-02 | End: 2022-05-12

## 2022-05-02 NOTE — PROGRESS NOTES
22  Estela Abraham : 1945 Sex: female  Age 77 y.o. Subjective:  Chief Complaint   Patient presents with    Headache    Cough    Pharyngitis    Drainage         HPI:   Estela Abraham , 77 y.o. female presents to [facility] for evaluation of headache, sore throat, cough    HPI    50-year-old female presents to [facility] for evaluation of headache, cough, sore throat, drainage. The patient has had the symptoms ongoing for about 2 and half weeks. She is not any chest pain or shortness of breath but when she does start coughing she started having some wheezing and become short of breath. The patient is not having any syncope. No hemoptysis. The patient has noted the symptoms for the last 2.5 weeks. The patient states that it does not seem to be improving despite using multiple over-the-counter products. ROS:   Unless otherwise stated in this report the patient's positive and negative responses for review of systems for constitutional, eyes, ENT, cardiovascular, respiratory, gastrointestinal, neurological, , musculoskeletal, and integument systems and related systems to the presenting problem are either stated in the history of present illness or were not pertinent or were negative for the symptoms and/or complaints related to the presenting medical problem. Positives and pertinent negatives as per HPI. All others reviewed and are negative.       PMH:     Past Medical History:   Diagnosis Date    Anemia     Anemia     Asthma     Breast cancer ([address]     lt breast ca    COPD (chronic obstructive pulmonary disease) (HCC)     Gastroesophageal reflux     [place] (hard of hearing)     HX: breast cancer     Right breast    Iron deficiency        Past Surgical History:   Procedure Laterality Date    BACK SURGERY      BRE[facility]      [facility]    COLONOSCOPY N/A 3/8/2019    COLONOSCOPY DIAGNOSTIC [facility] Smita [facility]iz [facility]      Dr. Annalisa Bajwa    UPPER GASTROINTESTINAL ENDOSCOPY N/A 3/8/2019    EGD BIOPSY performed by Sharita Kern MD at [facility] N/A 7/3/2019    EGD ESOPHAGOGASTRODUODENOSCOPY performed by Sharita Kern MD at [place] [address] History   Problem Relation Age of Onset    Cancer Mother         Pancreatic and liver    Diabetes Father     Stroke Father     Lupus Sister     Breast Cancer Sister        Medications:     Current Outpatient Medications:     doxycycline hyclate (VIBRA-TABS) 100 MG tablet, Take 1 tablet by mouth 2 times daily for 10 days, Disp: 20 tablet, Rfl: 0    methylPREDNISolone (MEDROL DOSEPACK) 4 MG tablet, Take by mouth., Disp: 1 kit, Rfl: 0    pantoprazole (PROTONIX) 40 MG tablet, TAKE 1 TABLET BY MOUTH TWICE DAILY FOR 30 DAYS THEN TAKE 1 TABLET BY MOUTH EVERY DAY, Disp: 90 tablet, Rfl: 1    albuterol sulfate HFA (PROAIR HFA) 108 (90 Base) MCG/ACT inhaler, Ok substitute, Disp: 1 each, Rfl: 3    Handicap Placard MISC, by Does not apply route Duration: 5 years, Disp: 1 each, Rfl: 0    Glucosamine HCl 1500 MG TABS, , Disp: , Rfl:     Omega-3 Fatty Acids (OMEGA-3 FISH OIL) 1000 MG CAPS, Take by mouth, Disp: , Rfl:     ibuprofen (ADVIL;MOTRIN) 800 MG tablet, Take by mouth, Disp: , Rfl:     therapeutic multivitamin-minerals (THERAGRAN-M) tablet, Take 1 tablet by mouth daily. , Disp: , Rfl:     Calcium Carbonate-Vitamin D (CALCIUM + D) 600-200 MG-UNIT TABS, Take  by mouth 2 times daily. , Disp: , Rfl:     Allergies:   No Known Allergies    Social History:     Social History     Tobacco Use    Smoking status: Former Smoker     Packs/day: 1.00     Years: 10.00     Pack years: 10.00     Quit date: 2002     Years since quittin.3    Smokeless tobacco: Never Used   Substance Use Topics    Alcohol use: Not on file    Drug use:  No Patient lives at home. Physical Exam:     Vitals:    05/02/22 1305   BP: 120/70   Site: Right Upper Arm   Position: Sitting   Pulse: 97   Temp: 97.2 °F (36.2 °C)   TempSrc: Temporal   SpO2: 99%   Weight: 181 lb 6.4 oz (82.3 kg)       Exam:  Physical Exam  Nurse's notes and vital signs reviewed. The patient is not hypoxic. ? General: Alert, no acute distress, patient resting comfortably Patient is not toxic or lethargic. Skin: Warm, intact, no pallor noted. There is no evidence of rash at this time. Head: Normocephalic, atraumatic  Eye: Normal conjunctiva  Ears, Nose, Throat: Right tympanic membrane clear, left tympanic membrane clear. No drainage or discharge noted. No pre- or post-auricular tenderness, erythema, or swelling noted. Nasal congestion, rhinorrhea, no epistaxis  Posterior oropharynx shows no erythema, tonsillar hypertrophy, or exudate. the uvula is midline. No trismus or drooling is noted. Moist mucous membranes. Cardiovascular: Regular Rate and Rhythm  Respiratory: No acute distress, wheezing and rhonchi noted throughout. No stridor or retractions are noted. Neurological: A&O x4, normal speech  Psychiatric: Cooperative         Testing:     Results for orders placed or performed in visit on 05/02/22   POCT COVID-19 Rapid, NAAT   Result Value Ref Range    SARS-COV-2, RdRp gene Negative Negative    Lot Number [id number]     QC Pass/Fail pass    POCT Influenza A/B   Result Value Ref Range    Influenza A Ab Negative     Influenza B Ab Negative      XR CHEST STANDARD (2 VW)    Result Date: 5/2/2022  EXAMINATION: TWO XRAY VIEWS OF THE CHEST 5/2/2022 1:46 pm COMPARISON: None. HISTORY: ORDERING SYSTEM PROVIDED HISTORY: Cough TECHNOLOGIST PROVIDED HISTORY: Reason for exam:->cough 2.5 weeks FINDINGS: The lungs are without acute focal process. There is no effusion or pneumothorax. The cardiomediastinal silhouette is without acute process. The osseous structures are without acute process.      No acute process. Hiatal hernia. Medical Decision Making:     Vital signs reviewed    Past medical history reviewed. Allergies reviewed. Medications reviewed. Patient on arrival does not appear to be in any apparent distress or discomfort. The patient has been seen and evaluated. The patient does not appear to be toxic or lethargic. The patient had COVID and influenza obtained. Both of which were negative. Patient was sent for a chest x-ray. Chest x-ray did not show any evidence of acute cardiopulmonary process. The patient will be started on doxycycline and prednisone. There was evidence of a hiatal hernia. The patient was educated on the proper dosage of motrin and tylenol and the appropriate intervals of each. The patient is to increase fluid intake over the next several days. The patient is to use OTC decongestant as needed. The patient is to return to express care or go directly to the emergency department should any of the signs or symptoms worsen. The patient is to followup with primary care physician in 2-3 days for repeat evaluation. The patient has no other questions or concerns at this time the patient will be discharged home. Clinical Impression:   Monse Knapp was seen today for headache, cough, pharyngitis and drainage. Diagnoses and all orders for this visit:    Acute bronchitis, unspecified organism    Cough  -     POCT COVID-19 Rapid, NAAT  -     POCT Influenza A/B  -     XR CHEST STANDARD (2 VW); Future    Other orders  -     doxycycline hyclate (VIBRA-TABS) 100 MG tablet; Take 1 tablet by mouth 2 times daily for 10 days  -     methylPREDNISolone (MEDROL DOSEPACK) 4 MG tablet; Take by mouth. The patient is to call for any concerns or return if any of the signs or symptoms worsen. The patient is to follow-up with PCP in the next 2-3 days for repeat evaluation repeat assessment or go directly to the emergency department.      SIGNATURE: Naeem Filter III, ABEL

## 2022-06-08 ENCOUNTER — HOSPITAL ENCOUNTER (OUTPATIENT)
Dept: GENERAL RADIOLOGY | Age: 77
Discharge: HOME | End: 2022-06-10
Payer: MEDICARE

## 2022-06-08 VITALS — BODY MASS INDEX: 30.73 KG/M2 | WEIGHT: 180 LBS | HEIGHT: 64 IN

## 2022-06-08 DIAGNOSIS — Z12.31 VISIT FOR SCREENING MAMMOGRAM: ICD-10-CM

## 2022-06-08 PROCEDURE — 77063 BREAST TOMOSYNTHESIS BI: CPT

## 2022-06-19 LAB
ALBUMIN SERPL-MCNC: 4.4 G/DL
ALP BLD-CCNC: 65 U/L
ALT SERPL-CCNC: 20 U/L
ANION GAP SERPL CALCULATED.3IONS-SCNC: NORMAL MMOL/L
AST SERPL-CCNC: 19 U/L
AVERAGE GLUCOSE: NORMAL
BASOPHILS ABSOLUTE: 30 /ΜL
BASOPHILS RELATIVE PERCENT: 0.6 %
BILIRUB SERPL-MCNC: 0.7 MG/DL (ref 0.1–1.4)
BUN BLDV-MCNC: 17 MG/DL
CALCIUM SERPL-MCNC: 9.1 MG/DL
CHLORIDE BLD-SCNC: 106 MMOL/L
CO2: 27 MMOL/L
CREAT SERPL-MCNC: 0.73 MG/DL
EOSINOPHILS ABSOLUTE: 90 /ΜL
EOSINOPHILS RELATIVE PERCENT: 1.8 %
GFR CALCULATED: 81
GLUCOSE BLD-MCNC: 130 MG/DL
HBA1C MFR BLD: 6.3 %
HCT VFR BLD CALC: 45.3 % (ref 36–46)
HEMOGLOBIN: 15 G/DL (ref 12–16)
LYMPHOCYTES ABSOLUTE: 1020 /ΜL
LYMPHOCYTES RELATIVE PERCENT: 20.4 %
MCH RBC QN AUTO: 28.6 PG
MCHC RBC AUTO-ENTMCNC: 33.3 G/DL
MCV RBC AUTO: 85.8 FL
MONOCYTES ABSOLUTE: 405 /ΜL
MONOCYTES RELATIVE PERCENT: 8.1 %
NEUTROPHILS ABSOLUTE: 3455 /ΜL
NEUTROPHILS RELATIVE PERCENT: 69.1 %
PDW BLD-RTO: 13 %
PLATELET # BLD: 248 K/ΜL
PMV BLD AUTO: 9.1 FL
POTASSIUM SERPL-SCNC: 4.2 MMOL/L
RBC # BLD: 5.28 10^6/ΜL
SODIUM BLD-SCNC: 142 MMOL/L
TOTAL PROTEIN: 6.6
WBC # BLD: 5 10^3/ML

## 2022-06-20 ENCOUNTER — OFFICE VISIT (OUTPATIENT)
Dept: PRIMARY CARE CLINIC | Age: 77
End: 2022-06-20
Payer: MEDICARE

## 2022-06-20 VITALS
OXYGEN SATURATION: 97 % | BODY MASS INDEX: 30.9 KG/M2 | WEIGHT: 181 LBS | HEIGHT: 64 IN | TEMPERATURE: 97.3 F | SYSTOLIC BLOOD PRESSURE: 114 MMHG | HEART RATE: 87 BPM | DIASTOLIC BLOOD PRESSURE: 80 MMHG

## 2022-06-20 DIAGNOSIS — J45.20 MILD INTERMITTENT ASTHMA WITHOUT COMPLICATION: ICD-10-CM

## 2022-06-20 DIAGNOSIS — Z17.0 MALIGNANT NEOPLASM OF LEFT BREAST IN FEMALE, ESTROGEN RECEPTOR POSITIVE, UNSPECIFIED SITE OF BREAST (HCC): ICD-10-CM

## 2022-06-20 DIAGNOSIS — E78.5 HYPERLIPIDEMIA, UNSPECIFIED HYPERLIPIDEMIA TYPE: ICD-10-CM

## 2022-06-20 DIAGNOSIS — C50.912 MALIGNANT NEOPLASM OF LEFT BREAST IN FEMALE, ESTROGEN RECEPTOR POSITIVE, UNSPECIFIED SITE OF BREAST (HCC): ICD-10-CM

## 2022-06-20 DIAGNOSIS — M19.90 OSTEOARTHRITIS, UNSPECIFIED OSTEOARTHRITIS TYPE, UNSPECIFIED SITE: ICD-10-CM

## 2022-06-20 DIAGNOSIS — R73.01 IMPAIRED FASTING GLUCOSE: ICD-10-CM

## 2022-06-20 DIAGNOSIS — R53.83 FATIGUE, UNSPECIFIED TYPE: Primary | ICD-10-CM

## 2022-06-20 DIAGNOSIS — Z85.3 HX: BREAST CANCER: ICD-10-CM

## 2022-06-20 PROCEDURE — 1123F ACP DISCUSS/DSCN MKR DOCD: CPT | Performed by: FAMILY MEDICINE

## 2022-06-20 PROCEDURE — 99214 OFFICE O/P EST MOD 30 MIN: CPT | Performed by: FAMILY MEDICINE

## 2022-06-20 ASSESSMENT — PATIENT HEALTH QUESTIONNAIRE - PHQ9
SUM OF ALL RESPONSES TO PHQ QUESTIONS 1-9: 0
SUM OF ALL RESPONSES TO PHQ9 QUESTIONS 1 & 2: 0
SUM OF ALL RESPONSES TO PHQ QUESTIONS 1-9: 0
2. FEELING DOWN, DEPRESSED OR HOPELESS: 0
1. LITTLE INTEREST OR PLEASURE IN DOING THINGS: 0
SUM OF ALL RESPONSES TO PHQ QUESTIONS 1-9: 0
SUM OF ALL RESPONSES TO PHQ QUESTIONS 1-9: 0

## 2022-06-20 NOTE — PROGRESS NOTES
22     Stephanie Bueno    : 1945 Sex: female   Age: 77 y.o. Chief Complaint   Patient presents with    Discuss Labs    Fatigue     after having BW done has been very fatigued       Prior to Admission medications    Medication Sig Start Date End Date Taking? Authorizing Provider   pantoprazole (PROTONIX) 40 MG tablet TAKE 1 TABLET BY MOUTH TWICE DAILY FOR 30 DAYS THEN TAKE 1 TABLET BY MOUTH EVERY DAY 3/16/22  Yes Miroslava Aguilar DO   albuterol sulfate HFA (PROAIR HFA) 108 (90 Base) MCG/ACT inhaler Ok substitute 3/16/22  Yes Natalya Leong DO   Handicap Placard MISC by Does not apply route Duration: 5 years 21  Yes Miroslava Aguilar DO   Glucosamine HCl 1500 MG TABS  10/27/12  Yes Historical Provider, MD   Omega-3 Fatty Acids (OMEGA-3 FISH OIL) 1000 MG CAPS Take by mouth 2/15/11  Yes Historical Provider, MD   ibuprofen (ADVIL;MOTRIN) 800 MG tablet Take by mouth 17  Yes Historical Provider, MD   therapeutic multivitamin-minerals (THERAGRAN-M) tablet Take 1 tablet by mouth daily. Yes Historical Provider, MD   Calcium Carbonate-Vitamin D (CALCIUM + D) 600-200 MG-UNIT TABS Take  by mouth 2 times daily. Yes Historical Provider, MD          HPI: Bambi presents today complaints of fatigue arthralgias hyperlipidemia impaired fasting glucose. Overall medically seems to be doing well. Lab studies reviewed were actually very stable hemoglobin hematocrit normal 15.1 and 45.3 glycohemoglobin at 6.3 sugar 130 continue encourage diet exercise present meds. Reflux disease gastritis maintain on Protonix on a daily basis as needed only. Remainder meds to continue as prescribed. Review of Systems   Constitutional: Negative. HENT: Negative. Eyes: Negative. Respiratory: Negative. Gastrointestinal: Negative. Endocrine: Negative. Genitourinary: Negative. Musculoskeletal: Negative. Skin: Negative. Allergic/Immunologic: Negative.     Neurological: Negative. Hematological: Negative. Psychiatric/Behavioral: Negative. Systems review overall stable aside from fatigue encourage proper rest and current meds and if persistent or worsening symptoms will follow-up. Current Outpatient Medications:     pantoprazole (PROTONIX) 40 MG tablet, TAKE 1 TABLET BY MOUTH TWICE DAILY FOR 30 DAYS THEN TAKE 1 TABLET BY MOUTH EVERY DAY, Disp: 90 tablet, Rfl: 1    albuterol sulfate HFA (PROAIR HFA) 108 (90 Base) MCG/ACT inhaler, Ok substitute, Disp: 1 each, Rfl: 3    Handicap Placard MISC, by Does not apply route Duration: 5 years, Disp: 1 each, Rfl: 0    Glucosamine HCl 1500 MG TABS, , Disp: , Rfl:     Omega-3 Fatty Acids (OMEGA-3 FISH OIL) 1000 MG CAPS, Take by mouth, Disp: , Rfl:     ibuprofen (ADVIL;MOTRIN) 800 MG tablet, Take by mouth, Disp: , Rfl:     therapeutic multivitamin-minerals (THERAGRAN-M) tablet, Take 1 tablet by mouth daily. , Disp: , Rfl:     Calcium Carbonate-Vitamin D (CALCIUM + D) 600-200 MG-UNIT TABS, Take  by mouth 2 times daily. , Disp: , Rfl:     No Known Allergies    Social History     Tobacco Use    Smoking status: Former Smoker     Packs/day: 1.00     Years: 10.00     Pack years: 10.00     Quit date: 2002     Years since quittin.4    Smokeless tobacco: Never Used   Substance Use Topics    Alcohol use: Not on file    Drug use: No      Past Surgical History:   Procedure Laterality Date   [facility] LUMPECTOMY      Dr. [facility]    COLONOSCOPY N/A 3/8/2019    COLONOSCOPY DIAGNOSTIC performed by Wyatt Collet, MD at [facility] ([address] [place] [address])     Adelaida Bingham      Dr. Ronald Evans ENDOSCOPY N/A 3/8/2019    EGD BIOPSY performed by Wyatt Collet, MD at [facility] N/A 7/3/2019    EGD ESOPHAGOGASTRODUODENOSCOPY performed by Ranjana Yeh Jesse Bowman MD at [facility] ENDOSCOPY     Family History   Problem Relation Age of Onset    Cancer Mother         Pancreatic and liver    Diabetes Father     Stroke Father     Lupus Sister     Breast Cancer Sister 48    Cancer Son         leukemia     Past Medical History:   Diagnosis Date    Anemia     Anemia     Asthma     Breast cancer ([address] 2010    lt breast ca    COPD (chronic obstructive pulmonary disease) (HCC)     Gastroesophageal reflux     [place] (hard of hearing)     HX: breast cancer     Right breast    Iron deficiency        Vitals:    06/20/22 1501   BP: 114/80   Pulse: 87   Temp: 97.3 °F (36.3 °C)   SpO2: 97%   Weight: 181 lb (82.1 kg)   Height: 5' 4\" (1.626 m)     BP Readings from Last 3 Encounters:   06/20/22 114/80   05/02/22 120/70   04/13/22 124/64        Physical Exam  Vitals and nursing note reviewed. Constitutional:       Appearance: She is well-developed. HENT:      Head: Normocephalic. Right Ear: External ear normal.      Left Ear: External ear normal.      Nose: Nose normal.   Eyes:      Conjunctiva/sclera: Conjunctivae normal.      Pupils: Pupils are equal, round, and reactive to light. Cardiovascular:      Rate and Rhythm: Normal rate. Pulmonary:      Breath sounds: Normal breath sounds. Abdominal:      General: Bowel sounds are normal.      Palpations: Abdomen is soft. Musculoskeletal:         General: Normal range of motion. Cervical back: Normal range of motion and neck supple. Skin:     General: Skin is warm and dry. Neurological:      Mental Status: She is alert and oriented to person, place, and time. Psychiatric:         Behavior: Behavior normal.     Today's vitals physical examination stable. Medications as prescribed. Follow-up visit 3 months and blood work again at that time. Labs reviewed with her and are stable.   Lab Results   Component Value Date    TSH 4.71 04/08/2022    TSH 3.13 10/22/2021    J8NALKA 128.90 06/22/2015    L2OMZMK 7.3 04/08/2022    C2UFJGZ 7.4 10/22/2021     Lab Results   Component Value Date    CHOL 215 04/08/2022    CHOL 180 10/22/2021     Lab Results   Component Value Date    TRIG 101 04/08/2022    TRIG 127 10/22/2021     Lab Results   Component Value Date    HDL 67 04/08/2022    HDL 58 10/22/2021     No results found for: ROMARIO MOTHER [facility]  Lab Results   Component Value Date    LABVLDL 14 01/21/2016    LABVLDL 14 06/22/2015     Lab Results   Component Value Date    CHOLHDLRATIO 3.2 04/08/2022    CHOLHDLRATIO 3.1 10/22/2021     Lab Results   Component Value Date    WBC 4.3 04/08/2022    HGB 16.3 (A) 04/08/2022    HCT 48.3 (A) 04/08/2022    MCV 86.3 04/08/2022     04/08/2022    LYMPHOPCT 25.1 04/08/2022    RBC 5.60 04/08/2022    MCH 29.1 04/08/2022    MCHC 33.7 04/08/2022    RDW 12.8 04/08/2022     Lab Results   Component Value Date     04/08/2022    K 4.3 04/08/2022     04/08/2022    CO2 30 04/08/2022    BUN 13 04/08/2022    CREATININE 0.77 04/08/2022    GLUCOSE 107 04/08/2022    CALCIUM 9.6 04/08/2022    PROT 7.2 08/15/2019    LABALBU 4.7 04/08/2022    BILITOT 0.8 04/08/2022    ALKPHOS 69 04/08/2022    AST 25 04/08/2022    ALT 20 04/08/2022    LABGLOM 76 04/08/2022    GFRAA >60 08/15/2019      No results found for: PSA, PSADIA   Lab Results   Component Value Date    LABA1C 5.8 10/22/2021     No results found for: EAG           Plan Per Assessment:  Anisha Peterson was seen today for discuss labs and fatigue. Diagnoses and all orders for this visit:    Fatigue, unspecified type    Malignant neoplasm of left breast in female, estrogen receptor positive, unspecified site of breast ([address]    Osteoarthritis, unspecified osteoarthritis type, unspecified site    Hyperlipidemia, unspecified hyperlipidemia type    Mild intermittent asthma without complication    HX: breast cancer            Return in about 3 months (around 9/20/2022).       Alia Mckeon,     Note was generated with the assistance of voice recognition software. Document was reviewed however may contain grammatical errors.

## 2022-06-22 DIAGNOSIS — E78.5 HYPERLIPIDEMIA, UNSPECIFIED HYPERLIPIDEMIA TYPE: ICD-10-CM

## 2022-06-22 DIAGNOSIS — R73.01 IMPAIRED FASTING GLUCOSE: ICD-10-CM

## 2022-09-02 RX ORDER — PANTOPRAZOLE SODIUM 40 MG/1
TABLET, DELAYED RELEASE ORAL
Qty: 90 TABLET | Refills: 1 | Status: SHIPPED
Start: 2022-09-02 | End: 2022-09-19 | Stop reason: SDUPTHER

## 2022-09-19 ENCOUNTER — OFFICE VISIT (OUTPATIENT)
Dept: PRIMARY CARE CLINIC | Age: 77
End: 2022-09-19
Payer: MEDICARE

## 2022-09-19 VITALS
SYSTOLIC BLOOD PRESSURE: 120 MMHG | HEART RATE: 68 BPM | OXYGEN SATURATION: 95 % | DIASTOLIC BLOOD PRESSURE: 70 MMHG | WEIGHT: 174 LBS | TEMPERATURE: 98.1 F | BODY MASS INDEX: 29.87 KG/M2

## 2022-09-19 DIAGNOSIS — R73.01 IMPAIRED FASTING GLUCOSE: ICD-10-CM

## 2022-09-19 DIAGNOSIS — R05.9 COUGH: Primary | ICD-10-CM

## 2022-09-19 DIAGNOSIS — K21.00 GASTROESOPHAGEAL REFLUX DISEASE WITH ESOPHAGITIS WITHOUT HEMORRHAGE: ICD-10-CM

## 2022-09-19 DIAGNOSIS — U07.1 COVID-19: ICD-10-CM

## 2022-09-19 PROCEDURE — 99214 OFFICE O/P EST MOD 30 MIN: CPT | Performed by: FAMILY MEDICINE

## 2022-09-19 PROCEDURE — 1123F ACP DISCUSS/DSCN MKR DOCD: CPT | Performed by: FAMILY MEDICINE

## 2022-09-19 RX ORDER — PREDNISONE 10 MG/1
TABLET ORAL
Qty: 30 TABLET | Refills: 0 | Status: SHIPPED
Start: 2022-09-19 | End: 2022-10-10

## 2022-09-19 RX ORDER — PANTOPRAZOLE SODIUM 40 MG/1
TABLET, DELAYED RELEASE ORAL
Qty: 90 TABLET | Refills: 1 | Status: SHIPPED | OUTPATIENT
Start: 2022-09-19 | End: 2022-09-19 | Stop reason: SDUPTHER

## 2022-09-19 RX ORDER — PANTOPRAZOLE SODIUM 40 MG/1
TABLET, DELAYED RELEASE ORAL
Qty: 90 TABLET | Refills: 1 | Status: SHIPPED | OUTPATIENT
Start: 2022-09-19

## 2022-09-19 RX ORDER — DOXYCYCLINE HYCLATE 100 MG/1
100 CAPSULE ORAL 2 TIMES DAILY
Qty: 28 CAPSULE | Refills: 0 | Status: SHIPPED | OUTPATIENT
Start: 2022-09-19 | End: 2022-10-03

## 2022-09-19 ASSESSMENT — ENCOUNTER SYMPTOMS
EYES NEGATIVE: 1
GASTROINTESTINAL NEGATIVE: 1
WHEEZING: 1
ALLERGIC/IMMUNOLOGIC NEGATIVE: 1
COUGH: 1

## 2022-09-19 NOTE — PROGRESS NOTES
22     Bessie Richmond    : 1945 Sex: female   Age: 69 y.o. Chief Complaint   Patient presents with    Hyperlipidemia    Post-COVID Symptoms     Tested positive  ---        Prior to Admission medications    Medication Sig Start Date End Date Taking? Authorizing Provider   doxycycline hyclate (VIBRAMYCIN) 100 MG capsule Take 1 capsule by mouth 2 times daily for 14 days 9/19/22 10/3/22 Yes Anton Aguilar DO   predniSONE (DELTASONE) 10 MG tablet 3 qd x5days then 2 qd x5days then 1 qd x5days 22  Yes Jayson Aguilar DO   pantoprazole (PROTONIX) 40 MG tablet TAKE 1 TABLET BY MOUTH TWICE DAILY FOR 30 DAYS THEN TAKE 1 TABLET BY MOUTH EVERY DAY 22  Yes Anton Aguilar DO   albuterol sulfate HFA (PROAIR HFA) 108 (90 Base) MCG/ACT inhaler Ok substitute 3/16/22  Yes Kely Fleming,    Handicap Placard MISC by Does not apply route Duration: 5 years 21  Yes Anton Aguilar DO   Glucosamine HCl 1500 MG TABS  10/27/12  Yes Historical Provider, MD   Omega-3 Fatty Acids (OMEGA-3 FISH OIL) 1000 MG CAPS Take by mouth 2/15/11  Yes Historical Provider, MD   ibuprofen (ADVIL;MOTRIN) 800 MG tablet Take by mouth 17  Yes Historical Provider, MD   therapeutic multivitamin-minerals (THERAGRAN-M) tablet Take 1 tablet by mouth daily. Yes Historical Provider, MD   calcium carbonate-vitamin D 600-200 MG-UNIT TABS Take  by mouth 2 times daily. Yes Historical Provider, MD          HPI: Patient evaluated today with cough reflux disease recent COVID-19 infection. Approximately 10 days out but persistent upper respiratory drainage and cough. Reflux controlled with Protonix to just once a day and will maintain prescription provided today. Addition of doxycycline prednisone today. Persistent upper respiratory symptoms as well as some mild chest congestion wheezing. Review of Systems   Constitutional: Negative. HENT:  Positive for congestion. Eyes: Negative. Respiratory:  Positive for cough and wheezing. Gastrointestinal: Negative. Endocrine: Negative. Genitourinary: Negative. Musculoskeletal: Negative. Skin: Negative. Allergic/Immunologic: Negative. Neurological: Negative. Hematological: Negative. Psychiatric/Behavioral: Negative. Current Outpatient Medications:     doxycycline hyclate (VIBRAMYCIN) 100 MG capsule, Take 1 capsule by mouth 2 times daily for 14 days, Disp: 28 capsule, Rfl: 0    predniSONE (DELTASONE) 10 MG tablet, 3 qd x5days then 2 qd x5days then 1 qd x5days, Disp: 30 tablet, Rfl: 0    pantoprazole (PROTONIX) 40 MG tablet, TAKE 1 TABLET BY MOUTH TWICE DAILY FOR 30 DAYS THEN TAKE 1 TABLET BY MOUTH EVERY DAY, Disp: 90 tablet, Rfl: 1    albuterol sulfate HFA (PROAIR HFA) 108 (90 Base) MCG/ACT inhaler, Ok substitute, Disp: 1 each, Rfl: 3    Handicap Placard MISC, by Does not apply route Duration: 5 years, Disp: 1 each, Rfl: 0    Glucosamine HCl 1500 MG TABS, , Disp: , Rfl:     Omega-3 Fatty Acids (OMEGA-3 FISH OIL) 1000 MG CAPS, Take by mouth, Disp: , Rfl:     ibuprofen (ADVIL;MOTRIN) 800 MG tablet, Take by mouth, Disp: , Rfl:     therapeutic multivitamin-minerals (THERAGRAN-M) tablet, Take 1 tablet by mouth daily. , Disp: , Rfl:     calcium carbonate-vitamin D 600-200 MG-UNIT TABS, Take  by mouth 2 times daily. , Disp: , Rfl:     No Known Allergies    Social History     Tobacco Use    Smoking status: Former     Packs/day: 1.00     Years: 10.00     Pack years: 10.00     Types: Cigarettes     Quit date: 2002     Years since quittin.7    Smokeless tobacco: Never   Substance Use Topics    Drug use: No      Past Surgical History:   Procedure Laterality Date    [facility] LUMPECTOMY      Dr. Franc Lara [facility]    COLONOSCOPY N/A 3/8/2019    COLONOSCOPY DIAGNOSTIC performed by Kyler Velez MD at [facility] (CERVIX STATUS UNKNOWN)      LYMPH NODE DISSECTION  2010    Dr. Sabine Robles N/A 3/8/2019    EGD BIOPSY performed by Yareli Marion MD at [address] [facility] N/A 7/3/2019    EGD ESOPHAGOGASTRODUODENOSCOPY performed by Yareli Marion MD at [facility] History   Problem Relation Age of Onset    Cancer Mother         Pancreatic and liver    Diabetes Father     Stroke Father     Lupus Sister     Breast Cancer Sister 48    Cancer Son         leukemia     Past Medical History:   Diagnosis Date    Anemia     Anemia     Asthma     Breast cancer ([address] 2010    lt breast ca    COPD (chronic obstructive pulmonary disease) (HCC)     Gastroesophageal reflux     [place] (hard of hearing)     HX: breast cancer     Right breast    Iron deficiency        Vitals:    09/19/22 1439   BP: 120/70   Pulse: 68   Temp: 98.1 °F (36.7 °C)   SpO2: 95%   Weight: 174 lb (78.9 kg)     BP Readings from Last 3 Encounters:   09/19/22 120/70   06/20/22 114/80   05/02/22 120/70        Physical Exam  Vitals and nursing note reviewed. Constitutional:       Appearance: She is well-developed. HENT:      Head: Normocephalic. Right Ear: External ear normal.      Left Ear: External ear normal.      Nose: Nose normal.   Eyes:      Conjunctiva/sclera: Conjunctivae normal.      Pupils: Pupils are equal, round, and reactive to light. Cardiovascular:      Rate and Rhythm: Normal rate. Pulmonary:      Breath sounds: Normal breath sounds. Abdominal:      General: Bowel sounds are normal.      Palpations: Abdomen is soft. Musculoskeletal:         General: Normal range of motion. Cervical back: Normal range of motion and neck supple. Skin:     General: Skin is warm and dry. Neurological:      Mental Status: She is alert and oriented to person, place, and time. Psychiatric:         Behavior: Behavior normal.      Today systems review is overall stable.   Upper respiratory congestion and cough as noted. Recent COVID viral exposure. +10 days ago. Addition of doxycycline prednisone as noted. Follow-up with me in 3 weeks and blood work just prior. Plan Per Assessment:  Robert Mitchell was seen today for hyperlipidemia and post-covid symptoms. Diagnoses and all orders for this visit:    Cough    COVID-19    Gastroesophageal reflux disease with esophagitis without hemorrhage    Impaired fasting glucose    Other orders  -     Discontinue: pantoprazole (PROTONIX) 40 MG tablet; TAKE 1 TABLET BY MOUTH TWICE DAILY FOR 30 DAYS THEN TAKE 1 TABLET BY MOUTH EVERY DAY  -     doxycycline hyclate (VIBRAMYCIN) 100 MG capsule; Take 1 capsule by mouth 2 times daily for 14 days  -     predniSONE (DELTASONE) 10 MG tablet; 3 qd x5days then 2 qd x5days then 1 qd x5days  -     pantoprazole (PROTONIX) 40 MG tablet; TAKE 1 TABLET BY MOUTH TWICE DAILY FOR 30 DAYS THEN TAKE 1 TABLET BY MOUTH EVERY DAY          Return in about 3 weeks (around 10/10/2022). Monika Nieves,     Note was generated with the assistance of voice recognition software. Document was reviewed however may contain grammatical errors.

## 2022-10-07 LAB
ALBUMIN SERPL-MCNC: 4.2 G/DL
ALP BLD-CCNC: 59 U/L
ALT SERPL-CCNC: 20 U/L
ANION GAP SERPL CALCULATED.3IONS-SCNC: NORMAL MMOL/L
AST SERPL-CCNC: 23 U/L
AVERAGE GLUCOSE: NORMAL
BASOPHILS ABSOLUTE: 42 /ΜL
BASOPHILS RELATIVE PERCENT: 0.8 %
BILIRUB SERPL-MCNC: 1.1 MG/DL (ref 0.1–1.4)
BUN BLDV-MCNC: 14 MG/DL
CALCIUM SERPL-MCNC: 9.1 MG/DL
CHLORIDE BLD-SCNC: 101 MMOL/L
CO2: 30 MMOL/L
CREAT SERPL-MCNC: 0.77 MG/DL
EOSINOPHILS ABSOLUTE: 90 /ΜL
EOSINOPHILS RELATIVE PERCENT: 1.7 %
GFR CALCULATED: 80
GLUCOSE BLD-MCNC: 120 MG/DL
HBA1C MFR BLD: 6.5 %
HCT VFR BLD CALC: 43.7 % (ref 36–46)
HEMOGLOBIN: 14.6 G/DL (ref 12–16)
LYMPHOCYTES ABSOLUTE: 1049 /ΜL
LYMPHOCYTES RELATIVE PERCENT: 19.8 %
MCH RBC QN AUTO: 28.9 PG
MCHC RBC AUTO-ENTMCNC: 33.4 G/DL
MCV RBC AUTO: 86.4 FL
MONOCYTES ABSOLUTE: 631 /ΜL
MONOCYTES RELATIVE PERCENT: 11.9 %
NEUTROPHILS ABSOLUTE: 3487 /ΜL
NEUTROPHILS RELATIVE PERCENT: 65.8 %
PDW BLD-RTO: 13.1 %
PLATELET # BLD: 229 K/ΜL
PMV BLD AUTO: 8.8 FL
POTASSIUM SERPL-SCNC: 4.2 MMOL/L
RBC # BLD: 5.06 10^6/ΜL
SODIUM BLD-SCNC: 139 MMOL/L
T4 TOTAL: 6.9
TOTAL PROTEIN: 6.3
TSH SERPL DL<=0.05 MIU/L-ACNC: 3.25 UIU/ML
WBC # BLD: 5.3 10^3/ML

## 2022-10-08 LAB
CHOLESTEROL, TOTAL: 209 MG/DL
CHOLESTEROL/HDL RATIO: 2.6
HDLC SERPL-MCNC: 80 MG/DL (ref 35–70)
LDL CHOLESTEROL CALCULATED: 110 MG/DL (ref 0–160)
NONHDLC SERPL-MCNC: 129 MG/DL
TRIGL SERPL-MCNC: 98 MG/DL
VLDLC SERPL CALC-MCNC: ABNORMAL MG/DL

## 2022-10-10 ENCOUNTER — OFFICE VISIT (OUTPATIENT)
Dept: PRIMARY CARE CLINIC | Age: 77
End: 2022-10-10
Payer: MEDICARE

## 2022-10-10 VITALS
DIASTOLIC BLOOD PRESSURE: 80 MMHG | OXYGEN SATURATION: 97 % | TEMPERATURE: 96.5 F | WEIGHT: 174 LBS | HEIGHT: 64 IN | HEART RATE: 102 BPM | BODY MASS INDEX: 29.71 KG/M2 | SYSTOLIC BLOOD PRESSURE: 110 MMHG

## 2022-10-10 DIAGNOSIS — C50.912 MALIGNANT NEOPLASM OF LEFT BREAST IN FEMALE, ESTROGEN RECEPTOR POSITIVE, UNSPECIFIED SITE OF BREAST (HCC): ICD-10-CM

## 2022-10-10 DIAGNOSIS — Z17.0 MALIGNANT NEOPLASM OF LEFT BREAST IN FEMALE, ESTROGEN RECEPTOR POSITIVE, UNSPECIFIED SITE OF BREAST (HCC): ICD-10-CM

## 2022-10-10 DIAGNOSIS — M19.90 OSTEOARTHRITIS, UNSPECIFIED OSTEOARTHRITIS TYPE, UNSPECIFIED SITE: ICD-10-CM

## 2022-10-10 DIAGNOSIS — R73.01 IMPAIRED FASTING GLUCOSE: ICD-10-CM

## 2022-10-10 DIAGNOSIS — E78.5 HYPERLIPIDEMIA, UNSPECIFIED HYPERLIPIDEMIA TYPE: ICD-10-CM

## 2022-10-10 DIAGNOSIS — M54.2 CERVICALGIA: Primary | ICD-10-CM

## 2022-10-10 DIAGNOSIS — K29.70 GASTRITIS WITHOUT BLEEDING, UNSPECIFIED CHRONICITY, UNSPECIFIED GASTRITIS TYPE: ICD-10-CM

## 2022-10-10 DIAGNOSIS — R53.83 FATIGUE, UNSPECIFIED TYPE: ICD-10-CM

## 2022-10-10 DIAGNOSIS — Z23 NEED FOR INFLUENZA VACCINATION: ICD-10-CM

## 2022-10-10 PROCEDURE — 90694 VACC AIIV4 NO PRSRV 0.5ML IM: CPT | Performed by: FAMILY MEDICINE

## 2022-10-10 PROCEDURE — 1123F ACP DISCUSS/DSCN MKR DOCD: CPT | Performed by: FAMILY MEDICINE

## 2022-10-10 PROCEDURE — G0008 ADMIN INFLUENZA VIRUS VAC: HCPCS | Performed by: FAMILY MEDICINE

## 2022-10-10 PROCEDURE — 99214 OFFICE O/P EST MOD 30 MIN: CPT | Performed by: FAMILY MEDICINE

## 2022-10-10 ASSESSMENT — ENCOUNTER SYMPTOMS
EYES NEGATIVE: 1
GASTROINTESTINAL NEGATIVE: 1
ALLERGIC/IMMUNOLOGIC NEGATIVE: 1
RESPIRATORY NEGATIVE: 1

## 2022-10-10 NOTE — PROGRESS NOTES
10/10/22     Erika Gupta    : 1945 Sex: female   Age: 69 y.o. Chief Complaint   Patient presents with    Discuss Labs    Neck Pain     Right sided neck stiffness and pain into shoulder         Prior to Admission medications    Medication Sig Start Date End Date Taking? Authorizing Provider   pantoprazole (PROTONIX) 40 MG tablet TAKE 1 TABLET BY MOUTH TWICE DAILY FOR 30 DAYS THEN TAKE 1 TABLET BY MOUTH EVERY DAY 22  Yes Isabela Aguilar, DO   albuterol sulfate HFA (PROAIR HFA) 108 (90 Base) MCG/ACT inhaler Ok substitute 3/16/22  Yes Jayson Aguilar DO   Glucosamine HCl 1500 MG TABS  10/27/12  Yes Historical Provider, MD   Omega-3 Fatty Acids (OMEGA-3 FISH OIL) 1000 MG CAPS Take by mouth 2/15/11  Yes Historical Provider, MD   ibuprofen (ADVIL;MOTRIN) 800 MG tablet Take by mouth 17  Yes Historical Provider, MD   therapeutic multivitamin-minerals (THERAGRAN-M) tablet Take 1 tablet by mouth daily. Yes Historical Provider, MD   calcium carbonate-vitamin D 600-200 MG-UNIT TABS Take  by mouth 2 times daily. Yes Historical Provider, MD          HPI: Patient seen today neck discomfort related to muscular spasm. Recommended some moist heat Tylenol proper rest and range of motion exercises at home. Breast cancer history stable. Hyperlipidemia stable. Flu shot provided today. Review of Systems   Constitutional: Negative. HENT: Negative. Eyes: Negative. Respiratory: Negative. Gastrointestinal: Negative. Endocrine: Negative. Genitourinary: Negative. Musculoskeletal: Negative. Skin: Negative. Allergic/Immunologic: Negative. Neurological: Negative. Hematological: Negative. Psychiatric/Behavioral: Negative. Today systems review stable medications as prescribed.       Current Outpatient Medications:     pantoprazole (PROTONIX) 40 MG tablet, TAKE 1 TABLET BY MOUTH TWICE DAILY FOR 30 DAYS THEN TAKE 1 TABLET BY MOUTH EVERY DAY, Disp: 90 tablet, Rfl: 1    albuterol sulfate HFA (PROAIR HFA) 108 (90 Base) MCG/ACT inhaler, Ok substitute, Disp: 1 each, Rfl: 3    Glucosamine HCl 1500 MG TABS, , Disp: , Rfl:     Omega-3 Fatty Acids (OMEGA-3 FISH OIL) 1000 MG CAPS, Take by mouth, Disp: , Rfl:     ibuprofen (ADVIL;MOTRIN) 800 MG tablet, Take by mouth, Disp: , Rfl:     therapeutic multivitamin-minerals (THERAGRAN-M) tablet, Take 1 tablet by mouth daily. , Disp: , Rfl:     calcium carbonate-vitamin D 600-200 MG-UNIT TABS, Take  by mouth 2 times daily. , Disp: , Rfl:     No Known Allergies    Social History     Tobacco Use    Smoking status: Former     Packs/day: 1.00     Years: 10.00     Pack years: 10.00     Types: Cigarettes     Quit date: 2002     Years since quittin.7    Smokeless tobacco: Never   Substance Use Topics    Drug use: No      Past Surgical History:   Procedure Laterality Date    [facility]      Dr. Eliseo Walters [facility]    COLONOSCOPY N/A 3/8/2019    COLONOSCOPY DIAGNOSTIC performed by Jack House MD at [facility] ([facility])      [facility]      Dr. Mary Alice Gonzáles ENDOSCOPY N/A 3/8/2019    EGD BIOPSY performed by Jack House MD at [address] [place] [address] N/A 7/3/2019    EGD ESOPHAGOGASTRODUODENOSCOPY performed by Jack House MD at [address] [facility] History   Problem Relation Age of Onset    Cancer Mother         Pancreatic and liver    Diabetes Father     Stroke Father     Lupus Sister     Breast Cancer Sister 48    Cancer Son         leukemia     Past Medical History:   Diagnosis Date    Anemia     Anemia     Asthma     [facility]     lt breast ca    COPD (chronic obstructive pulmonary disease) ([facility]     Gastroesophageal reflux     [place] (hard of hearing)     HX: breast cancer     Right breast    Iron deficiency        Vitals:    10/10/22 1430   BP: 110/80   Pulse: (!) 102   Temp: (!) 96.5 °F (35.8 °C)   SpO2: 97%   Weight: 174 lb (78.9 kg)   Height: 5' 4\" (1.626 m)     BP Readings from Last 3 Encounters:   10/10/22 110/80   09/19/22 120/70   06/20/22 114/80        Physical Exam  Vitals and nursing note reviewed. Constitutional:       Appearance: She is well-developed. HENT:      Head: Normocephalic. Right Ear: External ear normal.      Left Ear: External ear normal.      Nose: Nose normal.   Eyes:      Conjunctiva/sclera: Conjunctivae normal.      Pupils: Pupils are equal, round, and reactive to light. Cardiovascular:      Rate and Rhythm: Normal rate. Pulmonary:      Breath sounds: Normal breath sounds. Abdominal:      General: Bowel sounds are normal.      Palpations: Abdomen is soft. Musculoskeletal:         General: Normal range of motion. Cervical back: Normal range of motion and neck supple. Skin:     General: Skin is warm and dry. Neurological:      Mental Status: She is alert and oriented to person, place, and time. Psychiatric:         Behavior: Behavior normal.      Today's vitals physical exam stable. We will maintain current meds and care. Labs reviewed cholesterol 209 HDL 80  glucose 120 with an A1c at 6.5 thyroid good control. Medications as prescribed and reassessment with me again in 3 months sooner if problems. Blood work again in [facility]. Plan Per Assessment:  Maritza Obregon was seen today for discuss labs and neck pain.     Diagnoses and all orders for this visit:    Cervicalgia    Gastritis without bleeding, unspecified chronicity, unspecified gastritis type    Osteoarthritis, unspecified osteoarthritis type, unspecified site    Malignant neoplasm of left breast in female, estrogen receptor positive, unspecified site of breast ([facility] [address]    Hyperlipidemia, unspecified hyperlipidemia type    Need for influenza vaccination  -     Influenza, FLUAD, (age 72 y+), IM, Preservative Free, 0.5 mL          Return in about 3 months (around 1/10/2023). Mary Villalobos DO    Note was generated with the assistance of voice recognition software. Document was reviewed however may contain grammatical errors.

## 2022-10-19 PROBLEM — R05.9 COUGH: Status: RESOLVED | Noted: 2021-12-15 | Resolved: 2022-10-19

## 2023-01-04 ENCOUNTER — TELEPHONE (OUTPATIENT)
Dept: PRIMARY CARE CLINIC | Age: 78
End: 2023-01-04

## 2023-01-04 NOTE — TELEPHONE ENCOUNTER
----- Message from Susana Robles sent at 1/4/2023  2:57 PM EST -----  Subject: Referral Request    Reason for referral request? bloodwork pt has an appt 1/10 and wants to   know if she needs bloodwork done for the appt. If so we need to send it to   U[facility] Go in [facility]. Provider patient wants to be referred to(if known):     Provider Phone Number(if known): Additional Information for Provider?  Please call pt to advise.  ---------------------------------------------------------------------------  --------------  Jamal Dominguez QL    [phone]; OK to leave message on voicemail  ---------------------------------------------------------------------------  --------------

## 2023-01-11 ENCOUNTER — OFFICE VISIT (OUTPATIENT)
Dept: PRIMARY CARE CLINIC | Age: 78
End: 2023-01-11
Payer: MEDICARE

## 2023-01-11 VITALS
TEMPERATURE: 97.4 F | BODY MASS INDEX: 30.05 KG/M2 | WEIGHT: 176 LBS | HEART RATE: 97 BPM | SYSTOLIC BLOOD PRESSURE: 118 MMHG | HEIGHT: 64 IN | DIASTOLIC BLOOD PRESSURE: 76 MMHG | OXYGEN SATURATION: 97 %

## 2023-01-11 DIAGNOSIS — K22.4 ESOPHAGEAL SPASM: ICD-10-CM

## 2023-01-11 DIAGNOSIS — E78.5 HYPERLIPIDEMIA, UNSPECIFIED HYPERLIPIDEMIA TYPE: ICD-10-CM

## 2023-01-11 DIAGNOSIS — R35.0 URINARY FREQUENCY: Primary | ICD-10-CM

## 2023-01-11 DIAGNOSIS — K21.00 GASTROESOPHAGEAL REFLUX DISEASE WITH ESOPHAGITIS WITHOUT HEMORRHAGE: ICD-10-CM

## 2023-01-11 DIAGNOSIS — R35.0 URINARY FREQUENCY: ICD-10-CM

## 2023-01-11 LAB
BILIRUBIN, POC: ABNORMAL
BLOOD URINE, POC: ABNORMAL
CLARITY, POC: ABNORMAL
COLOR, POC: YELLOW
GLUCOSE URINE, POC: ABNORMAL
KETONES, POC: ABNORMAL
LEUKOCYTE EST, POC: ABNORMAL
NITRITE, POC: POSITIVE
PH, POC: 5.5
PROTEIN, POC: ABNORMAL
SPECIFIC GRAVITY, POC: 1.02
UROBILINOGEN, POC: 0.2

## 2023-01-11 PROCEDURE — 81002 URINALYSIS NONAUTO W/O SCOPE: CPT | Performed by: FAMILY MEDICINE

## 2023-01-11 PROCEDURE — 1123F ACP DISCUSS/DSCN MKR DOCD: CPT | Performed by: FAMILY MEDICINE

## 2023-01-11 PROCEDURE — 99214 OFFICE O/P EST MOD 30 MIN: CPT | Performed by: FAMILY MEDICINE

## 2023-01-11 RX ORDER — SULFAMETHOXAZOLE AND TRIMETHOPRIM 800; 160 MG/1; MG/1
1 TABLET ORAL 2 TIMES DAILY
Qty: 14 TABLET | Refills: 0 | Status: SHIPPED | OUTPATIENT
Start: 2023-01-11 | End: 2023-01-18

## 2023-01-11 ASSESSMENT — ENCOUNTER SYMPTOMS
GASTROINTESTINAL NEGATIVE: 1
ALLERGIC/IMMUNOLOGIC NEGATIVE: 1
EYES NEGATIVE: 1
RESPIRATORY NEGATIVE: 1

## 2023-01-11 NOTE — PROGRESS NOTES
23     Antoni Dawood    : 1945 Sex: female   Age: 69 y.o. Chief Complaint   Patient presents with    Urinary Frequency     Having issues with frequency and burning off and on for about 2-3 week    Congestion     Started a few days ago       Prior to Admission medications    Medication Sig Start Date End Date Taking? Authorizing Provider   sulfamethoxazole-trimethoprim (BACTRIM DS;SEPTRA DS) 800-160 MG per tablet Take 1 tablet by mouth 2 times daily for 7 days 23 Yes Jayson Aguilar DO   pantoprazole (PROTONIX) 40 MG tablet TAKE 1 TABLET BY MOUTH TWICE DAILY FOR 30 DAYS THEN TAKE 1 TABLET BY MOUTH EVERY DAY 22  Yes Deneise Doctor Jeff, DO   albuterol sulfate HFA (PROAIR HFA) 108 (90 Base) MCG/ACT inhaler Ok substitute 3/16/22  Yes Jayson Aguilar DO   Glucosamine HCl 1500 MG TABS  10/27/12  Yes Historical Provider, MD   Omega-3 Fatty Acids (OMEGA-3 FISH OIL) 1000 MG CAPS Take by mouth 2/15/11  Yes Historical Provider, MD   ibuprofen (ADVIL;MOTRIN) 800 MG tablet Take by mouth 17  Yes Historical Provider, MD   therapeutic multivitamin-minerals (THERAGRAN-M) tablet Take 1 tablet by mouth daily. Yes Historical Provider, MD   calcium carbonate-vitamin D 600-200 MG-UNIT TABS Take  by mouth 2 times daily. Yes Historical Provider, MD          HPI: Patient evaluated today urinary frequency some dysuria and we are going to send urine for culture. Placed on Bactrim twice a day for a week and follow-up culture and sensitivity. Reflux disease is present and some difficulties with esophageal spasm and swallowing. Recommending further evaluation with Dr. Tomas Rivera and possible upper endoscopy. Review of Systems   Constitutional: Negative. HENT: Negative. Eyes: Negative. Respiratory: Negative. Gastrointestinal: Negative. Endocrine: Negative. Genitourinary: Negative. Musculoskeletal: Negative. Skin: Negative. Allergic/Immunologic: Negative.   Neurological: Negative.    Hematological: Negative.    Psychiatric/Behavioral: Negative.              Current Outpatient Medications:     sulfamethoxazole-trimethoprim (BACTRIM DS;SEPTRA DS) 800-160 MG per tablet, Take 1 tablet by mouth 2 times daily for 7 days, Disp: 14 tablet, Rfl: 0    pantoprazole (PROTONIX) 40 MG tablet, TAKE 1 TABLET BY MOUTH TWICE DAILY FOR 30 DAYS THEN TAKE 1 TABLET BY MOUTH EVERY DAY, Disp: 90 tablet, Rfl: 1    albuterol sulfate HFA (PROAIR HFA) 108 (90 Base) MCG/ACT inhaler, Ok substitute, Disp: 1 each, Rfl: 3    Glucosamine HCl 1500 MG TABS, , Disp: , Rfl:     Omega-3 Fatty Acids (OMEGA-3 FISH OIL) 1000 MG CAPS, Take by mouth, Disp: , Rfl:     ibuprofen (ADVIL;MOTRIN) 800 MG tablet, Take by mouth, Disp: , Rfl:     therapeutic multivitamin-minerals (THERAGRAN-M) tablet, Take 1 tablet by mouth daily., Disp: , Rfl:     calcium carbonate-vitamin D 600-200 MG-UNIT TABS, Take  by mouth 2 times daily., Disp: , Rfl:     No Known Allergies    Social History     Tobacco Use    Smoking status: Former     Packs/day: 1.00     Years: 10.00     Pack years: 10.00     Types: Cigarettes     Quit date: 2002     Years since quittin.0    Smokeless tobacco: Never   Substance Use Topics    Drug use: No      Past Surgical History:   Procedure Laterality Date    BACK SURGERY      [facility]    COLONOSCOPY N/A 3/8/2019    COLONOSCOPY DIAGNOSTIC performed by Nick Marsh MD at [facility] ENDOSCOPY    HYSTERECTOMY (CERVIX STATUS UNKNOWN)      LYMPH NODE DISSECTION      Dr. Gonzalez [facility]    UPPER GASTROINTESTINAL ENDOSCOPY N/A 3/8/2019    EGD BIOPSY performed by Nick Marsh MD at [facility] ENDOSCOPY    UPPER GASTROINTESTINAL ENDOSCOPY N/A 7/3/2019    EGD ESOPHAGOGASTRODUODENOSCOPY performed by Nick Marsh MD at [facility] ENDOSCOPY     Family History   Problem Relation Age of Onset    Cancer Mother         Pancreatic  and liver    Diabetes Father     Stroke Father     Lupus Sister     Breast Cancer Sister 48    Cancer Son         leukemia     Past Medical History:   Diagnosis Date    Anemia     Anemia     Asthma     Breast cancer ([facility] [address] 2010    lt breast ca    COPD (chronic obstructive pulmonary disease) (HCC)     Gastroesophageal reflux     [place] (hard of hearing)     HX: breast cancer     Right breast    Iron deficiency        Vitals:    01/11/23 1351   BP: 118/76   Pulse: 97   Temp: 97.4 °F (36.3 °C)   SpO2: 97%   Weight: 176 lb (79.8 kg)   Height: 5' 4\" (1.626 m)     BP Readings from Last 3 Encounters:   01/11/23 118/76   10/10/22 110/80   09/19/22 120/70        Physical Exam  Vitals and nursing note reviewed. Constitutional:       Appearance: She is well-developed. HENT:      Head: Normocephalic. Right Ear: External ear normal.      Left Ear: External ear normal.      Nose: Nose normal.   Eyes:      Conjunctiva/sclera: Conjunctivae normal.      Pupils: Pupils are equal, round, and reactive to light. Cardiovascular:      Rate and Rhythm: Normal rate. Pulmonary:      Breath sounds: Normal breath sounds. Abdominal:      General: Bowel sounds are normal.      Palpations: Abdomen is soft. Musculoskeletal:         General: Normal range of motion. Cervical back: Normal range of motion and neck supple. Skin:     General: Skin is warm and dry. Neurological:      Mental Status: She is alert and oriented to person, place, and time. Psychiatric:         Behavior: Behavior normal.        Vitals physical exam stable. Heart is regular lungs are clear. Evidence of probable UTI and we will follow-up culture and then further direction. Leukocytes and nitrates present on UA. Clinically otherwise is stable some concerns though with reflux symptoms as well as esophageal spasms and recommending further surgical evaluation and possible upper endoscopy.         Plan Per Assessment:  Alexandre Garcia was seen today for urinary frequency and congestion. Diagnoses and all orders for this visit:    Urinary frequency  -     Culture, Urine; Future  -     POCT Urinalysis no Micro    Hyperlipidemia, unspecified hyperlipidemia type    Gastroesophageal reflux disease with esophagitis without hemorrhage    Esophageal spasm  -     Ambulatory referral to General Surgery    Other orders  -     sulfamethoxazole-trimethoprim (BACTRIM DS;SEPTRA DS) 800-160 MG per tablet; Take 1 tablet by mouth 2 times daily for 7 days          No follow-ups on file. Lurdes Ma,     Note was generated with the assistance of voice recognition software. Document was reviewed however may contain grammatical errors.

## 2023-01-14 LAB
ORGANISM: ABNORMAL
ORGANISM: ABNORMAL
URINE CULTURE, ROUTINE: ABNORMAL
URINE CULTURE, ROUTINE: ABNORMAL

## 2023-02-02 ENCOUNTER — OFFICE VISIT (OUTPATIENT)
Dept: SURGERY | Age: 78
End: 2023-02-02
Payer: MEDICARE

## 2023-02-02 VITALS
SYSTOLIC BLOOD PRESSURE: 137 MMHG | OXYGEN SATURATION: 94 % | HEART RATE: 87 BPM | WEIGHT: 176 LBS | BODY MASS INDEX: 30.05 KG/M2 | HEIGHT: 64 IN | TEMPERATURE: 97.7 F | DIASTOLIC BLOOD PRESSURE: 88 MMHG

## 2023-02-02 DIAGNOSIS — K44.9 HIATAL HERNIA: Primary | ICD-10-CM

## 2023-02-02 PROCEDURE — 1123F ACP DISCUSS/DSCN MKR DOCD: CPT | Performed by: SURGERY

## 2023-02-02 PROCEDURE — 99203 OFFICE O/P NEW LOW 30 MIN: CPT | Performed by: SURGERY

## 2023-02-02 NOTE — PROGRESS NOTES
111 [facility] Surgery Clinic Note    Assessment/Plan:      Diagnosis Orders   1. Hiatal hernia  FL UGI    We discussed upper endoscopy. She does not want to pursue EGD. She is agreeable to an upper GI. Continue PPI at current prescribed dose. Dietary modificatio            Return for Results. Chief Complaint   Patient presents with    New Patient     Ref by Dr Padmini Kincaid for dysphagia but hasn't had a problem over a month       PCP: Luke Pizarro DO    HPI: Benita Giraldo is a 69 y.o. female who presents in consultation for dysphagia. She says over the last year she has had 3 times where she has had slow substernal discomfort, especially with eating quickly. Last time this happened was about 6 weeks ago over the holidays. There is no radiation of her pain. She has associated belching when this happens. She was last a couple of minutes and then resolved. She does endorse reflux. She is currently on Protonix and that controls her reflux for the most part. She denies any melena. Her last EGD in 2019 showed a large hiatal hernia.     Past Medical History:   Diagnosis Date    Anemia     Anemia     Asthma     Breast cancer ([address] 2010    lt breast ca    COPD (chronic obstructive pulmonary disease) ([address]     Gastroesophageal reflux     [place] (hard of hearing)     HX: breast cancer     Right breast    Iron deficiency        Past Surgical History:   Procedure Laterality Date    Barrie Angel LUMPECTOMY  2010    Dr. Ashley Kulkarni - [facility]    COLONOSCOPY N/A 3/8/2019    COLONOSCOPY DIAGNOSTIC performed by Arby Najjar, MD at [facility] ([address] [place] [address])      Deepa Fajardo  2010    Dr. Dl Barajas ENDOSCOPY N/A 3/8/2019    EGD BIOPSY performed by Arby Najjar, MD at [address] [place] [address] N/A 7/3/2019    EGD ESOPHAGOGASTRODUODENOSCOPY performed by Alonso De Guzman Kiki Hartley MD at [address] [place] [address]       Prior to Admission medications    Medication Sig Start Date End Date Taking? Authorizing Provider   pantoprazole (PROTONIX) 40 MG tablet TAKE 1 TABLET BY MOUTH TWICE DAILY FOR 30 DAYS THEN TAKE 1 TABLET BY MOUTH EVERY DAY 22  Yes Rosa Aguilar,    albuterol sulfate HFA (PROAIR HFA) 108 (90 Base) MCG/ACT inhaler Ok substitute 3/16/22  Yes Jayson Aguilar DO   Glucosamine HCl 1500 MG TABS  10/27/12  Yes Historical Provider, MD   Omega-3 Fatty Acids (OMEGA-3 FISH OIL) 1000 MG CAPS Take by mouth 2/15/11  Yes Historical Provider, MD   therapeutic multivitamin-minerals (THERAGRAN-M) tablet Take 1 tablet by mouth daily. Yes Historical Provider, MD   calcium carbonate-vitamin D 600-200 MG-UNIT TABS Take  by mouth 2 times daily.    Yes Historical Provider, MD   ibuprofen (ADVIL;MOTRIN) 800 MG tablet Take by mouth  Patient not taking: Reported on 2023   Historical Provider, MD       No Known Allergies    Social History     Socioeconomic History    Marital status:      Spouse name: None    Number of children: None    Years of education: None    Highest education level: None   Tobacco Use    Smoking status: Former     Packs/day: 1.00     Years: 10.00     Pack years: 10.00     Types: Cigarettes     Quit date: 2002     Years since quittin.1    Smokeless tobacco: Never   Substance and Sexual Activity    Drug use: No     Social Determinants of Health     Financial Resource Strain: Low Risk     Difficulty of Paying Living Expenses: Not hard at all   Food Insecurity: No Food Insecurity    Worried About Running Out of Food in the Last Year: Never true    Ran Out of Food in the Last Year: Never true       Family History   Problem Relation Age of Onset    Cancer Mother         Pancreatic and liver    Diabetes Father     Stroke Father     Lupus Sister     Breast Cancer Sister 48    Cancer Son         leukemia       Review of Systems   All other systems reviewed and are negative. Objective:  Vitals:    02/02/23 1351   BP: 137/88   Pulse: 87   Temp: 97.7 °F (36.5 °C)   TempSrc: Temporal   SpO2: 94%   Weight: 176 lb (79.8 kg)   Height: 5' 4\" (1.626 m)          Physical Exam  Constitutional:       Appearance: She is obese. HENT:      Head: Normocephalic and atraumatic. Eyes:      General:         Right eye: No discharge. Left eye: No discharge. Neck:      Trachea: No tracheal deviation. Cardiovascular:      Rate and Rhythm: Normal rate. Pulmonary:      Effort: Pulmonary effort is normal. No respiratory distress. Abdominal:      General: There is no distension. Palpations: Abdomen is soft. Tenderness: There is no abdominal tenderness. There is no guarding or rebound. Skin:     General: Skin is warm and dry. Neurological:      Mental Status: She is alert and oriented to person, place, and time. Birdie Gibbs MD    I have examined the patient and performed the key aspects of physical exam, reviewed the record (including all pertinent and new radiology images and laboratory findings, referring provider notes and results), and discussed the case with the primary and referring provider where applicable. NOTE: This report, in part or full,may have been transcribed using voice recognition software. Every effort was made to ensure accuracy; however, inadvertent computerized transcription errors may be present. Please excuse any transcriptional grammatical or spelling errors that may have escaped my editorial review.     CC: Jey Aguilar DO

## 2023-02-17 ENCOUNTER — HOSPITAL ENCOUNTER (OUTPATIENT)
Dept: GENERAL RADIOLOGY | Age: 78
End: 2023-02-17
Payer: MEDICARE

## 2023-02-17 DIAGNOSIS — K44.9 HIATAL HERNIA: ICD-10-CM

## 2023-02-17 PROCEDURE — 2500000003 HC RX 250 WO HCPCS: Performed by: RADIOLOGY

## 2023-02-17 PROCEDURE — A4641 RADIOPHARM DX AGENT NOC: HCPCS | Performed by: RADIOLOGY

## 2023-02-17 PROCEDURE — 6370000000 HC RX 637 (ALT 250 FOR IP): Performed by: RADIOLOGY

## 2023-02-17 PROCEDURE — 74240 X-RAY XM UPR GI TRC 1CNTRST: CPT

## 2023-02-17 PROCEDURE — 6360000004 HC RX CONTRAST MEDICATION: Performed by: RADIOLOGY

## 2023-02-17 RX ADMIN — BARIUM SULFATE 140 ML: 980 POWDER, FOR SUSPENSION ORAL at 08:45

## 2023-02-17 RX ADMIN — BARIUM SULFATE 176 G: 960 POWDER, FOR SUSPENSION ORAL at 08:45

## 2023-02-17 RX ADMIN — BARIUM SULFATE 1 TABLET: 700 TABLET ORAL at 08:45

## 2023-02-17 RX ADMIN — ANTACID/ANTIFLATULENT 1 EACH: 380; 550; 10; 10 GRANULE, EFFERVESCENT ORAL at 08:45

## 2023-03-02 ENCOUNTER — OFFICE VISIT (OUTPATIENT)
Dept: SURGERY | Age: 78
End: 2023-03-02
Payer: MEDICARE

## 2023-03-02 VITALS
HEART RATE: 73 BPM | SYSTOLIC BLOOD PRESSURE: 128 MMHG | BODY MASS INDEX: 29.87 KG/M2 | WEIGHT: 174 LBS | OXYGEN SATURATION: 95 % | DIASTOLIC BLOOD PRESSURE: 84 MMHG

## 2023-03-02 DIAGNOSIS — R13.19 ESOPHAGEAL DYSPHAGIA: Primary | ICD-10-CM

## 2023-03-02 PROCEDURE — 99213 OFFICE O/P EST LOW 20 MIN: CPT | Performed by: SURGERY

## 2023-03-02 PROCEDURE — 1123F ACP DISCUSS/DSCN MKR DOCD: CPT | Performed by: SURGERY

## 2023-03-02 NOTE — PROGRESS NOTES
111 [facility] Surgery Clinic Note    Assessment/Plan:     Diagnosis Orders   1. Esophageal dysphagia      Secondary to large hiatal hernia. Dietary modification discussed. Return if symptoms worsen or fail to improve. Chief Complaint   Patient presents with    Results     Review UGI       PCP: Ihsan Huber DO    HPI: Antoni Seay is a 69 y.o. female here for follow-up of esophagram for hiatal hernia. She had declined upper endoscopy. She was having dysphagia with slow substernal discomfort several times over the last year. It was when she was eating large amounts quickly. Her esophagram showed a large hiatal hernia on review as well as having note of reflux. There is no other mass lesion or stricture. With dysphagia again is only intermittent. There is no abdominal pain. She does have some belching and reflux type symptoms. Protonix controls her reflux fairly well. Review of Systems   All other systems reviewed and are negative.       Past Medical History:   Diagnosis Date    Anemia     Anemia     Asthma     Breast cancer ([facility] [address] 2010    lt breast ca    COPD (chronic obstructive pulmonary disease) ([facility] [address]     Gastroesophageal reflux     [place] (hard of hearing)     HX: breast cancer     Right breast    Iron deficiency        Past Surgical History:   Procedure Laterality Date    Marylou Palm LUMPECTOMY  2010    Dr. Locke [facility]    COLONOSCOPY N/A 3/8/2019    COLONOSCOPY DIAGNOSTIC performed by Elle Jackson MD at [address] [place] [address]  2010    Dr. William Lake ENDOSCOPY N/A 3/8/2019    EGD BIOPSY performed by Elle Jackson MD at [facility] 68 N/A 7/3/2019    EGD ESOPHAGOGASTRODUODENOSCOPY performed by Elle Jackson MD at [facility] [place] 60 History   Problem Relation Age of Onset Cancer Mother         Pancreatic and liver    Diabetes Father     Stroke Father     Lupus Sister     Breast Cancer Sister 48    Cancer Son         leukemia       Social History     Socioeconomic History    Marital status:      Spouse name: Not on file    Number of children: Not on file    Years of education: Not on file    Highest education level: Not on file   Occupational History    Not on file   Tobacco Use    Smoking status: Former     Packs/day: 1.00     Years: 10.00     Pack years: 10.00     Types: Cigarettes     Quit date: 2002     Years since quittin.2    Smokeless tobacco: Never   Substance and Sexual Activity    Alcohol use: Not on file    Drug use: No    Sexual activity: Not on file   Other Topics Concern    Not on file   Social History Narrative    Not on file     Social Determinants of Health     Financial Resource Strain: Low Risk     Difficulty of Paying Living Expenses: Not hard at all   Food Insecurity: No Food Insecurity    Worried About Running Out of Food in the Last Year: Never true    Ran Out of Food in the Last Year: Never true   Transportation Needs: Not on file   Physical Activity: Not on file   Stress: Not on file   Social Connections: Not on file   Intimate Partner Violence: Not on file   Housing Stability: Not on file       No Known Allergies      Current Outpatient Medications   Medication Sig Dispense Refill    pantoprazole (PROTONIX) 40 MG tablet TAKE 1 TABLET BY MOUTH TWICE DAILY FOR 30 DAYS THEN TAKE 1 TABLET BY MOUTH EVERY DAY 90 tablet 1    albuterol sulfate HFA (PROAIR HFA) 108 (90 Base) MCG/ACT inhaler Ok substitute 1 each 3    Glucosamine HCl 1500 MG TABS       Omega-3 Fatty Acids (OMEGA-3 FISH OIL) 1000 MG CAPS Take by mouth      ibuprofen (ADVIL;MOTRIN) 800 MG tablet Take by mouth      therapeutic multivitamin-minerals (THERAGRAN-M) tablet Take 1 tablet by mouth daily. calcium carbonate-vitamin D 600-200 MG-UNIT TABS Take  by mouth 2 times daily.        No current facility-administered medications for this visit. The remainder of the past medical, past surgical, family, and psychosocial history, as well as medication and allergy review, were completed and are as documented elsewhere in the chart. Objective:  Vitals:    03/02/23 1511   BP: 128/84   Pulse: 73   SpO2: 95%   Weight: 174 lb (78.9 kg)          Physical Exam  Constitutional:       Appearance: She is overweight. HENT:      Head: Normocephalic and atraumatic. Eyes:      General:         Right eye: No discharge. Left eye: No discharge. Neck:      Trachea: No tracheal deviation. Cardiovascular:      Rate and Rhythm: Normal rate. Pulmonary:      Effort: Pulmonary effort is normal. No respiratory distress. Abdominal:      General: There is no distension. Palpations: Abdomen is soft. Tenderness: There is no abdominal tenderness. There is no guarding or rebound. Skin:     General: Skin is warm and dry. Neurological:      Mental Status: She is alert and oriented to person, place, and time. Genie Astorga MD      I have examined the patient and performed the key aspects of physical exam,and  reviewed the record (including all pertinent and new radiology images and laboratory findings, referring provider notes and results). NOTE: This report, in part or full, may have been transcribed using voice recognition software. Every effort was made to ensure accuracy; however, inadvertent computerized transcription errors may be present. Please excuse any transcriptional grammatical or spelling errors that may have escaped my editorial review.       CC: Khadra Aguilar,

## 2023-04-18 RX ORDER — PANTOPRAZOLE SODIUM 40 MG/1
TABLET, DELAYED RELEASE ORAL
Qty: 90 TABLET | Refills: 1 | Status: SHIPPED | OUTPATIENT
Start: 2023-04-18

## 2023-06-08 ENCOUNTER — HOSPITAL ENCOUNTER (OUTPATIENT)
Dept: GENERAL RADIOLOGY | Age: 78
Discharge: HOME | End: 2023-06-10
Payer: MEDICARE

## 2023-06-08 DIAGNOSIS — Z12.31 VISIT FOR SCREENING MAMMOGRAM: ICD-10-CM

## 2023-06-08 PROCEDURE — 77063 BREAST TOMOSYNTHESIS BI: CPT

## 2023-06-22 RX ORDER — ALBUTEROL SULFATE 90 UG/1
AEROSOL, METERED RESPIRATORY (INHALATION)
Qty: 1 EACH | Refills: 3 | Status: SHIPPED | OUTPATIENT
Start: 2023-06-22

## 2023-08-01 ENCOUNTER — OFFICE VISIT (OUTPATIENT)
Dept: PRIMARY CARE CLINIC | Age: 78
End: 2023-08-01
Payer: MEDICARE

## 2023-08-01 VITALS
TEMPERATURE: 98.2 F | BODY MASS INDEX: 29.19 KG/M2 | SYSTOLIC BLOOD PRESSURE: 132 MMHG | HEART RATE: 70 BPM | WEIGHT: 171 LBS | DIASTOLIC BLOOD PRESSURE: 80 MMHG | OXYGEN SATURATION: 95 % | HEIGHT: 64 IN

## 2023-08-01 DIAGNOSIS — K21.00 GASTROESOPHAGEAL REFLUX DISEASE WITH ESOPHAGITIS WITHOUT HEMORRHAGE: ICD-10-CM

## 2023-08-01 DIAGNOSIS — M25.531 RIGHT WRIST PAIN: ICD-10-CM

## 2023-08-01 DIAGNOSIS — Z85.3 HX: BREAST CANCER: ICD-10-CM

## 2023-08-01 DIAGNOSIS — C50.912 MALIGNANT NEOPLASM OF LEFT BREAST IN FEMALE, ESTROGEN RECEPTOR POSITIVE, UNSPECIFIED SITE OF BREAST (HCC): ICD-10-CM

## 2023-08-01 DIAGNOSIS — E78.5 HYPERLIPIDEMIA, UNSPECIFIED HYPERLIPIDEMIA TYPE: ICD-10-CM

## 2023-08-01 DIAGNOSIS — Z17.0 MALIGNANT NEOPLASM OF LEFT BREAST IN FEMALE, ESTROGEN RECEPTOR POSITIVE, UNSPECIFIED SITE OF BREAST (HCC): ICD-10-CM

## 2023-08-01 DIAGNOSIS — M19.90 OSTEOARTHRITIS, UNSPECIFIED OSTEOARTHRITIS TYPE, UNSPECIFIED SITE: ICD-10-CM

## 2023-08-01 DIAGNOSIS — M25.531 RIGHT WRIST PAIN: Primary | ICD-10-CM

## 2023-08-01 PROBLEM — R19.5 POSITIVE FIT (FECAL IMMUNOCHEMICAL TEST): Status: RESOLVED | Noted: 2021-12-15 | Resolved: 2023-08-01

## 2023-08-01 LAB
ABSOLUTE IMMATURE GRANULOCYTE: 0.08 K/UL (ref 0–0.58)
BASOPHILS ABSOLUTE: 0.04 K/UL (ref 0–0.2)
BASOPHILS RELATIVE PERCENT: 0 % (ref 0–2)
EOSINOPHILS ABSOLUTE: 0.03 K/UL (ref 0.05–0.5)
EOSINOPHILS RELATIVE PERCENT: 0 % (ref 0–6)
HCT VFR BLD CALC: 50.6 % (ref 34–48)
HEMOGLOBIN: 16.9 G/DL (ref 11.5–15.5)
IMMATURE GRANULOCYTES: 1 % (ref 0–5)
LYMPHOCYTES ABSOLUTE: 1.33 K/UL (ref 1.5–4)
LYMPHOCYTES RELATIVE PERCENT: 13 % (ref 20–42)
MCH RBC QN AUTO: 28.8 PG (ref 26–35)
MCHC RBC AUTO-ENTMCNC: 33.4 G/DL (ref 32–34.5)
MCV RBC AUTO: 86.2 FL (ref 80–99.9)
MONOCYTES ABSOLUTE: 0.74 K/UL (ref 0.1–0.95)
MONOCYTES RELATIVE PERCENT: 7 % (ref 2–12)
NEUTROPHILS ABSOLUTE: 8.26 K/UL (ref 1.8–7.3)
NEUTROPHILS RELATIVE PERCENT: 79 % (ref 43–80)
PDW BLD-RTO: 12.7 % (ref 11.5–15)
PLATELET # BLD: 389 K/UL (ref 130–450)
PMV BLD AUTO: 8.7 FL (ref 7–12)
RBC # BLD: 5.87 M/UL (ref 3.5–5.5)
WBC # BLD: 10.5 K/UL (ref 4.5–11.5)

## 2023-08-01 PROCEDURE — 1123F ACP DISCUSS/DSCN MKR DOCD: CPT | Performed by: FAMILY MEDICINE

## 2023-08-01 PROCEDURE — 99214 OFFICE O/P EST MOD 30 MIN: CPT | Performed by: FAMILY MEDICINE

## 2023-08-01 RX ORDER — IBUPROFEN 800 MG/1
TABLET ORAL
Qty: 60 TABLET | Refills: 0 | Status: SHIPPED | OUTPATIENT
Start: 2023-08-01

## 2023-08-01 SDOH — ECONOMIC STABILITY: INCOME INSECURITY: HOW HARD IS IT FOR YOU TO PAY FOR THE VERY BASICS LIKE FOOD, HOUSING, MEDICAL CARE, AND HEATING?: NOT HARD AT ALL

## 2023-08-01 SDOH — ECONOMIC STABILITY: FOOD INSECURITY: WITHIN THE PAST 12 MONTHS, YOU WORRIED THAT YOUR FOOD WOULD RUN OUT BEFORE YOU GOT MONEY TO BUY MORE.: NEVER TRUE

## 2023-08-01 SDOH — ECONOMIC STABILITY: HOUSING INSECURITY
IN THE LAST 12 MONTHS, WAS THERE A TIME WHEN YOU DID NOT HAVE A STEADY PLACE TO SLEEP OR SLEPT IN A SHELTER (INCLUDING NOW)?: NO

## 2023-08-01 SDOH — ECONOMIC STABILITY: FOOD INSECURITY: WITHIN THE PAST 12 MONTHS, THE FOOD YOU BOUGHT JUST DIDN'T LAST AND YOU DIDN'T HAVE MONEY TO GET MORE.: NEVER TRUE

## 2023-08-01 ASSESSMENT — PATIENT HEALTH QUESTIONNAIRE - PHQ9
1. LITTLE INTEREST OR PLEASURE IN DOING THINGS: 0
2. FEELING DOWN, DEPRESSED OR HOPELESS: 0
SUM OF ALL RESPONSES TO PHQ QUESTIONS 1-9: 0
SUM OF ALL RESPONSES TO PHQ9 QUESTIONS 1 & 2: 0
SUM OF ALL RESPONSES TO PHQ QUESTIONS 1-9: 0

## 2023-08-01 NOTE — PROGRESS NOTES
23     Scotty Rice    : 1945 Sex: female   Age: 69 y.o. Chief Complaint   Patient presents with    Gout       Prior to Admission medications    Medication Sig Start Date End Date Taking? Authorizing Provider   ibuprofen (ADVIL;MOTRIN) 800 MG tablet 1 q12 prn 23  Yes Ramsey Aguilar, DO   albuterol sulfate HFA [facility] HFA) 108 (90 Base) MCG/ACT inhaler Ok substitute 23  Yes Ramsey Aguilar DO   pantoprazole (PROTONIX) 40 MG tablet 1 po qd 23  Yes Jayson Aguilar, DO   Glucosamine HCl 1500 MG TABS  10/27/12  Yes Historical Provider, MD   Omega-3 Fatty Acids (OMEGA-3 FISH OIL) 1000 MG CAPS Take by mouth 2/15/11  Yes Historical Provider, MD   therapeutic multivitamin-minerals (THERAGRAN-M) tablet Take 1 tablet by mouth daily   Yes Historical Provider, MD   calcium carbonate-vitamin D 600-200 MG-UNIT TABS Take  by mouth 2 times daily. Yes Historical Provider, MD          HPI: Evaluated today right wrist pain persistent. We are going to toño-ray at this point continue with the wrist brace. Tension to the scaphoid and then have further follow-up if needed. Reflux disease hyperlipidemia stable. History of breast cancer stable. Recommending some baseline lab studies then will reassess next week. Including uric acid today. Review of Systems   Constitutional: Negative. HENT: Negative. Eyes: Negative. Respiratory: Negative. Gastrointestinal: Negative. Endocrine: Negative. Genitourinary: Negative. Musculoskeletal:  Positive for arthralgias. Skin: Negative. Allergic/Immunologic: Negative. Neurological: Negative. Hematological: Negative. Psychiatric/Behavioral: Negative. Wrist pain as noted.       Current Outpatient Medications:     ibuprofen (ADVIL;MOTRIN) 800 MG tablet, 1 q12 prn, Disp: 60 tablet, Rfl: 0    albuterol sulfate HFA (PROAIR HFA) 108 (90 Base) MCG/ACT inhaler, Ok substitute, Disp: 1 each, Rfl: 3    pantoprazole

## 2023-08-02 LAB
ALBUMIN SERPL-MCNC: 4.8 G/DL (ref 3.5–5.2)
ALP BLD-CCNC: 93 U/L (ref 35–104)
ALT SERPL-CCNC: 21 U/L (ref 0–32)
ANION GAP SERPL CALCULATED.3IONS-SCNC: 15 MMOL/L (ref 7–16)
AST SERPL-CCNC: 27 U/L (ref 0–31)
BILIRUB SERPL-MCNC: 0.5 MG/DL (ref 0–1.2)
BUN BLDV-MCNC: 20 MG/DL (ref 6–23)
C-REACTIVE PROTEIN: <3 MG/L (ref 0–5)
CALCIUM SERPL-MCNC: 10 MG/DL (ref 8.6–10.2)
CHLORIDE BLD-SCNC: 98 MMOL/L (ref 98–107)
CHOLESTEROL: 184 MG/DL
CO2: 25 MMOL/L (ref 22–29)
CREAT SERPL-MCNC: 0.8 MG/DL (ref 0.5–1)
GFR SERPL CREATININE-BSD FRML MDRD: >60 ML/MIN/1.73M2
GLUCOSE BLD-MCNC: 124 MG/DL (ref 74–99)
HDLC SERPL-MCNC: 63 MG/DL
LDL CHOLESTEROL: 96 MG/DL
POTASSIUM SERPL-SCNC: 4.6 MMOL/L (ref 3.5–5)
SEDIMENTATION RATE, ERYTHROCYTE: 2 MM/HR (ref 0–20)
SODIUM BLD-SCNC: 138 MMOL/L (ref 132–146)
T4 TOTAL: 8.7 UG/DL (ref 4.5–11.7)
TOTAL PROTEIN: 7.8 G/DL (ref 6.4–8.3)
TRIGL SERPL-MCNC: 126 MG/DL
TSH SERPL DL<=0.05 MIU/L-ACNC: 1.7 UIU/ML (ref 0.27–4.2)
URIC ACID: 4.8 MG/DL (ref 2.4–5.7)
VLDLC SERPL CALC-MCNC: 25 MG/DL

## 2024-04-29 RX ORDER — PANTOPRAZOLE SODIUM 40 MG/1
TABLET, DELAYED RELEASE ORAL
Qty: 90 TABLET | Refills: 1 | OUTPATIENT
Start: 2024-04-29

## 2024-05-21 RX ORDER — PANTOPRAZOLE SODIUM 40 MG/1
40 TABLET, DELAYED RELEASE ORAL DAILY
Qty: 90 TABLET | Refills: 0 | Status: SHIPPED | OUTPATIENT
Start: 2024-05-21

## 2024-06-10 ENCOUNTER — HOSPITAL ENCOUNTER (OUTPATIENT)
Dept: GENERAL RADIOLOGY | Age: 79
Discharge: HOME | End: 2024-06-12
Payer: COMMERCIAL

## 2024-06-10 VITALS — HEIGHT: 64 IN | BODY MASS INDEX: 29.37 KG/M2 | WEIGHT: 172 LBS

## 2024-06-10 DIAGNOSIS — Z12.31 VISIT FOR SCREENING MAMMOGRAM: ICD-10-CM

## 2024-06-10 PROCEDURE — 77063 BREAST TOMOSYNTHESIS BI: CPT

## 2024-07-01 LAB
ESTIMATED AVERAGE GLUCOSE: NORMAL
HBA1C MFR BLD: 6.8 %

## 2024-08-02 ENCOUNTER — TELEPHONE (OUTPATIENT)
Dept: PRIMARY CARE CLINIC | Age: 79
End: 2024-08-02

## 2024-08-08 ENCOUNTER — OFFICE VISIT (OUTPATIENT)
Dept: PRIMARY CARE CLINIC | Age: 79
End: 2024-08-08
Payer: COMMERCIAL

## 2024-08-08 VITALS
DIASTOLIC BLOOD PRESSURE: 60 MMHG | HEART RATE: 70 BPM | HEIGHT: 64 IN | WEIGHT: 172 LBS | SYSTOLIC BLOOD PRESSURE: 126 MMHG | BODY MASS INDEX: 29.37 KG/M2 | TEMPERATURE: 98.2 F | OXYGEN SATURATION: 95 %

## 2024-08-08 DIAGNOSIS — Z72.89 OTHER PROBLEMS RELATED TO LIFESTYLE: ICD-10-CM

## 2024-08-08 DIAGNOSIS — M19.90 OSTEOARTHRITIS, UNSPECIFIED OSTEOARTHRITIS TYPE, UNSPECIFIED SITE: ICD-10-CM

## 2024-08-08 DIAGNOSIS — Z23 NEED FOR PROPHYLACTIC VACCINATION AND INOCULATION AGAINST VARICELLA: ICD-10-CM

## 2024-08-08 DIAGNOSIS — C50.912 MALIGNANT NEOPLASM OF LEFT BREAST IN FEMALE, ESTROGEN RECEPTOR POSITIVE, UNSPECIFIED SITE OF BREAST (HCC): ICD-10-CM

## 2024-08-08 DIAGNOSIS — R30.0 DYSURIA: ICD-10-CM

## 2024-08-08 DIAGNOSIS — Z17.0 MALIGNANT NEOPLASM OF LEFT BREAST IN FEMALE, ESTROGEN RECEPTOR POSITIVE, UNSPECIFIED SITE OF BREAST (HCC): ICD-10-CM

## 2024-08-08 DIAGNOSIS — Z23 NEED FOR PROPHYLACTIC VACCINATION AGAINST DIPHTHERIA-TETANUS-PERTUSSIS (DTP): ICD-10-CM

## 2024-08-08 DIAGNOSIS — E78.5 HYPERLIPIDEMIA, UNSPECIFIED HYPERLIPIDEMIA TYPE: Primary | ICD-10-CM

## 2024-08-08 DIAGNOSIS — R73.01 IMPAIRED FASTING GLUCOSE: ICD-10-CM

## 2024-08-08 DIAGNOSIS — Z00.00 MEDICARE ANNUAL WELLNESS VISIT, SUBSEQUENT: Primary | ICD-10-CM

## 2024-08-08 DIAGNOSIS — K62.5 RECTAL BLEEDING: ICD-10-CM

## 2024-08-08 DIAGNOSIS — Z11.59 NEED FOR HEPATITIS C SCREENING TEST: ICD-10-CM

## 2024-08-08 LAB
BILIRUBIN, POC: NORMAL
BLOOD URINE, POC: NORMAL
CLARITY, POC: CLEAR
COLOR, POC: NORMAL
GLUCOSE URINE, POC: NORMAL
KETONES, POC: NORMAL
LEUKOCYTE EST, POC: NORMAL
NITRITE, POC: NORMAL
PH, POC: 6
PROTEIN, POC: NORMAL
SPECIFIC GRAVITY, POC: 1.01
UROBILINOGEN, POC: 0.2

## 2024-08-08 PROCEDURE — 81002 URINALYSIS NONAUTO W/O SCOPE: CPT | Performed by: FAMILY MEDICINE

## 2024-08-08 PROCEDURE — 99214 OFFICE O/P EST MOD 30 MIN: CPT | Performed by: FAMILY MEDICINE

## 2024-08-08 PROCEDURE — 1123F ACP DISCUSS/DSCN MKR DOCD: CPT | Performed by: FAMILY MEDICINE

## 2024-08-08 PROCEDURE — G0439 PPPS, SUBSEQ VISIT: HCPCS | Performed by: FAMILY MEDICINE

## 2024-08-08 RX ORDER — PANTOPRAZOLE SODIUM 40 MG/1
40 TABLET, DELAYED RELEASE ORAL DAILY
Qty: 90 TABLET | Refills: 3 | Status: SHIPPED | OUTPATIENT
Start: 2024-08-08

## 2024-08-08 ASSESSMENT — PATIENT HEALTH QUESTIONNAIRE - PHQ9
SUM OF ALL RESPONSES TO PHQ QUESTIONS 1-9: 1
SUM OF ALL RESPONSES TO PHQ QUESTIONS 1-9: 1
2. FEELING DOWN, DEPRESSED OR HOPELESS: SEVERAL DAYS
1. LITTLE INTEREST OR PLEASURE IN DOING THINGS: NOT AT ALL
SUM OF ALL RESPONSES TO PHQ QUESTIONS 1-9: 1
SUM OF ALL RESPONSES TO PHQ QUESTIONS 1-9: 1
SUM OF ALL RESPONSES TO PHQ9 QUESTIONS 1 & 2: 1

## 2024-08-08 ASSESSMENT — ENCOUNTER SYMPTOMS
EYES NEGATIVE: 1
GASTROINTESTINAL NEGATIVE: 1
RESPIRATORY NEGATIVE: 1
ALLERGIC/IMMUNOLOGIC NEGATIVE: 1

## 2024-08-08 ASSESSMENT — LIFESTYLE VARIABLES
HOW MANY STANDARD DRINKS CONTAINING ALCOHOL DO YOU HAVE ON A TYPICAL DAY: PATIENT DOES NOT DRINK
HOW OFTEN DO YOU HAVE A DRINK CONTAINING ALCOHOL: NEVER
HOW OFTEN DO YOU HAVE A DRINK CONTAINING ALCOHOL: NEVER

## 2024-08-08 NOTE — PATIENT INSTRUCTIONS

## 2024-08-08 NOTE — PROGRESS NOTES
Medicare Annual Wellness Visit    Lina Blackburn is here for Medicare AWV    Assessment & Plan   Medicare annual wellness visit, subsequent  -     Tdap (ADACEL) 5-2-15.5 LF-MCG/0.5 injection; Inject 0.5 mLs into the muscle once for 1 dose, Disp-0.5 mL, R-0Print  -     zoster recombinant adjuvanted vaccine (SHINGRIX) 50 MCG/0.5ML SUSR injection; Inject 0.5 mLs into the muscle once for 1 dose, Disp-0.5 mL, R-0Print  -     Hepatitis C Antibody; Future  Need for hepatitis C screening test  -     Hepatitis C Antibody; Future  Need for prophylactic vaccination against diphtheria-tetanus-pertussis (DTP)  -     Tdap (ADACEL) 5-2-15.5 LF-MCG/0.5 injection; Inject 0.5 mLs into the muscle once for 1 dose, Disp-0.5 mL, R-0Print  Need for prophylactic vaccination and inoculation against varicella  -     zoster recombinant adjuvanted vaccine (SHINGRIX) 50 MCG/0.5ML SUSR injection; Inject 0.5 mLs into the muscle once for 1 dose, Disp-0.5 mL, R-0Print  Other problems related to lifestyle  -     Hepatitis C Antibody; Future    Recommendations for Preventive Services Due: see orders and patient instructions/AVS.  Recommended screening schedule for the next 5-10 years is provided to the patient in written form: see Patient Instructions/AVS.     Return for Medicare Annual Wellness Visit in 1 year.     Subjective       Patient's complete Health Risk Assessment and screening values have been reviewed and are found in Flowsheets. The following problems were reviewed today and where indicated follow up appointments were made and/or referrals ordered.    Positive Risk Factor Screenings with Interventions:                Inactivity:  On average, how many days per week do you engage in moderate to strenuous exercise (like a brisk walk)?: 0 days (!) Abnormal  On average, how many minutes do you engage in exercise at this level?: 0 min  Interventions:  Increase as tolerated           Advanced Directives:  Do you have a Living Will?: (!)

## 2024-08-08 NOTE — PROGRESS NOTES
24     Lina Blackburn    : 1945 Sex: female   Age: 78 y.o.      Chief Complaint   Patient presents with    Hyperlipidemia       Prior to Admission medications    Medication Sig Start Date End Date Taking? Authorizing Provider   pantoprazole (PROTONIX) 40 MG tablet Take 1 tablet by mouth daily 24  Yes Jayson Aguilar DO   ibuprofen (ADVIL;MOTRIN) 800 MG tablet 1 q12 prn 23  Yes Jayson Aguilar DO   albuterol sulfate HFA (PROAIR HFA) 108 (90 Base) MCG/ACT inhaler Ok substitute 23  Yes Jayson Aguilar DO   Glucosamine HCl 1500 MG TABS  10/27/12  Yes ProviderMarisa MD   Omega-3 Fatty Acids (OMEGA-3 FISH OIL) 1000 MG CAPS Take by mouth 2/15/11  Yes Provider, MD Marisa   therapeutic multivitamin-minerals (THERAGRAN-M) tablet Take 1 tablet by mouth daily   Yes Provider, MD Marisa   calcium carbonate-vitamin D 600-200 MG-UNIT TABS Take  by mouth 2 times daily.   Yes Provider, MD Marisa   Tdap (ADACEL) 5-2-15.5 LF-MCG/0.5 injection Inject 0.5 mLs into the muscle once for 1 dose 24  Jayson Aguilar DO   zoster recombinant adjuvanted vaccine (SHINGRIX) 50 MCG/0.5ML SUSR injection Inject 0.5 mLs into the muscle once for 1 dose 24  Jayson Aguilar DO          HPI: Patient evaluated today history of breast malignancy and that is been very stable follows closely with oncology.  Hyperlipidemia osteoarthritic disease but stable.  She had a hospitalization in [place] back in April for rectal bleeding and workup was stable at that time hemoglobin hematocrit were stable.  Some external hemorrhoids were present and she has managed well since her discharge home.  Evidence of adult diabetes prediabetes and recommending continued dietary measures and we will have labs completed prior to next visit in 6 weeks.  Last A1c at 6.8 some lower urinary tract complaints today and will assess UA and then give further instructions.          Review of Systems

## 2024-08-10 RX ORDER — SULFAMETHOXAZOLE AND TRIMETHOPRIM 800; 160 MG/1; MG/1
1 TABLET ORAL 2 TIMES DAILY
Qty: 14 TABLET | Refills: 0 | Status: SHIPPED | OUTPATIENT
Start: 2024-08-10 | End: 2024-08-17

## 2024-08-11 LAB
CULTURE: ABNORMAL
SPECIMEN DESCRIPTION: ABNORMAL

## 2024-09-17 ENCOUNTER — TELEPHONE (OUTPATIENT)
Dept: PRIMARY CARE CLINIC | Age: 79
End: 2024-09-17

## 2024-09-18 LAB
ALBUMIN: 4.7 G/DL
ALP BLD-CCNC: 66 U/L
ALT SERPL-CCNC: 13 U/L
ANION GAP SERPL CALCULATED.3IONS-SCNC: ABNORMAL MMOL/L
AST SERPL-CCNC: 19 U/L
BASOPHILS ABSOLUTE: 22 /ΜL
BASOPHILS RELATIVE PERCENT: 0.5 %
BILIRUB SERPL-MCNC: 1 MG/DL (ref 0.1–1.4)
BUN BLDV-MCNC: 19 MG/DL
CALCIUM SERPL-MCNC: 9.6 MG/DL
CHLORIDE BLD-SCNC: 103 MMOL/L
CHOLESTEROL, TOTAL: 195 MG/DL
CHOLESTEROL/HDL RATIO: 2.8
CO2: 30 MMOL/L
CREAT SERPL-MCNC: 0.7 MG/DL
EOSINOPHILS ABSOLUTE: 77 /ΜL
EOSINOPHILS RELATIVE PERCENT: 1.8 %
ESTIMATED AVERAGE GLUCOSE: ABNORMAL
GFR, ESTIMATED: 88
GLUCOSE BLD-MCNC: 130 MG/DL
HBA1C MFR BLD: 6.2 %
HCT VFR BLD CALC: 46.7 % (ref 36–46)
HDLC SERPL-MCNC: 69 MG/DL (ref 35–70)
HEMOGLOBIN: 15.6 G/DL (ref 12–16)
LDL CHOLESTEROL: 111
LYMPHOCYTES ABSOLUTE: 1032 /ΜL
LYMPHOCYTES RELATIVE PERCENT: 24 %
MCH RBC QN AUTO: 29.7 PG
MCHC RBC AUTO-ENTMCNC: 33.4 G/DL
MCV RBC AUTO: 88.8 FL
MONOCYTES ABSOLUTE: 464 /ΜL
MONOCYTES RELATIVE PERCENT: 10.8 %
NEUTROPHILS ABSOLUTE: 2705 /ΜL
NEUTROPHILS RELATIVE PERCENT: 62.9 %
NONHDLC SERPL-MCNC: 126 MG/DL
PDW BLD-RTO: 13 %
PLATELET # BLD: 262 K/ΜL
PMV BLD AUTO: 8.7 FL
POTASSIUM SERPL-SCNC: 4.2 MMOL/L
RBC # BLD: 5.26 10^6/ΜL
SODIUM BLD-SCNC: 142 MMOL/L
T4 TOTAL: 8.5
TOTAL PROTEIN: 7.2 G/DL (ref 6.4–8.2)
TRIGL SERPL-MCNC: 63 MG/DL
TSH SERPL DL<=0.05 MIU/L-ACNC: 2.72 UIU/ML
VLDLC SERPL CALC-MCNC: ABNORMAL MG/DL
WBC # BLD: 4.3 10^3/ML

## 2024-09-19 ENCOUNTER — OFFICE VISIT (OUTPATIENT)
Dept: PRIMARY CARE CLINIC | Age: 79
End: 2024-09-19
Payer: COMMERCIAL

## 2024-09-19 VITALS
BODY MASS INDEX: 27.83 KG/M2 | WEIGHT: 163 LBS | DIASTOLIC BLOOD PRESSURE: 60 MMHG | HEART RATE: 87 BPM | SYSTOLIC BLOOD PRESSURE: 110 MMHG | HEIGHT: 64 IN | OXYGEN SATURATION: 94 % | TEMPERATURE: 98 F

## 2024-09-19 DIAGNOSIS — M19.90 OSTEOARTHRITIS, UNSPECIFIED OSTEOARTHRITIS TYPE, UNSPECIFIED SITE: ICD-10-CM

## 2024-09-19 DIAGNOSIS — C50.912 MALIGNANT NEOPLASM OF LEFT BREAST IN FEMALE, ESTROGEN RECEPTOR POSITIVE, UNSPECIFIED SITE OF BREAST (HCC): ICD-10-CM

## 2024-09-19 DIAGNOSIS — Z17.0 MALIGNANT NEOPLASM OF LEFT BREAST IN FEMALE, ESTROGEN RECEPTOR POSITIVE, UNSPECIFIED SITE OF BREAST (HCC): ICD-10-CM

## 2024-09-19 DIAGNOSIS — E78.5 HYPERLIPIDEMIA, UNSPECIFIED HYPERLIPIDEMIA TYPE: ICD-10-CM

## 2024-09-19 DIAGNOSIS — Z23 NEED FOR INFLUENZA VACCINATION: ICD-10-CM

## 2024-09-19 DIAGNOSIS — R73.01 IMPAIRED FASTING GLUCOSE: Primary | ICD-10-CM

## 2024-09-19 PROCEDURE — 99214 OFFICE O/P EST MOD 30 MIN: CPT | Performed by: FAMILY MEDICINE

## 2024-09-19 PROCEDURE — G0008 ADMIN INFLUENZA VIRUS VAC: HCPCS | Performed by: FAMILY MEDICINE

## 2024-09-19 PROCEDURE — 90653 IIV ADJUVANT VACCINE IM: CPT | Performed by: FAMILY MEDICINE

## 2024-09-19 PROCEDURE — 1123F ACP DISCUSS/DSCN MKR DOCD: CPT | Performed by: FAMILY MEDICINE

## 2024-10-10 DIAGNOSIS — M19.90 OSTEOARTHRITIS, UNSPECIFIED OSTEOARTHRITIS TYPE, UNSPECIFIED SITE: ICD-10-CM

## 2024-10-10 DIAGNOSIS — E78.5 HYPERLIPIDEMIA, UNSPECIFIED HYPERLIPIDEMIA TYPE: ICD-10-CM

## 2024-10-10 DIAGNOSIS — R73.01 IMPAIRED FASTING GLUCOSE: ICD-10-CM

## 2024-10-19 PROBLEM — Z23 NEED FOR INFLUENZA VACCINATION: Status: RESOLVED | Noted: 2019-11-19 | Resolved: 2024-10-19

## 2024-12-13 LAB
ALBUMIN: 4.6 G/DL
ALP BLD-CCNC: 73 U/L
ALT SERPL-CCNC: 12 U/L
ANION GAP SERPL CALCULATED.3IONS-SCNC: NORMAL MMOL/L
AST SERPL-CCNC: 17 U/L
BASOPHILS ABSOLUTE: 28 /ΜL
BASOPHILS RELATIVE PERCENT: 0.6 %
BILIRUB SERPL-MCNC: 0.7 MG/DL (ref 0.1–1.4)
BUN BLDV-MCNC: 17 MG/DL
CALCIUM SERPL-MCNC: 10.1 MG/DL
CHLORIDE BLD-SCNC: 102 MMOL/L
CHOLESTEROL, TOTAL: 193 MG/DL
CHOLESTEROL/HDL RATIO: 3
CO2: 28 MMOL/L
CREAT SERPL-MCNC: 0.75 MG/DL
EOSINOPHILS ABSOLUTE: 71 /ΜL
EOSINOPHILS RELATIVE PERCENT: 1.5 %
ESTIMATED AVERAGE GLUCOSE: NORMAL
GFR, ESTIMATED: 81
GLUCOSE BLD-MCNC: 131 MG/DL
HBA1C MFR BLD: 6.2 %
HCT VFR BLD CALC: 46.9 % (ref 36–46)
HDLC SERPL-MCNC: 64 MG/DL (ref 35–70)
HEMOGLOBIN: 15.5 G/DL (ref 12–16)
LDL CHOLESTEROL: 112
LYMPHOCYTES ABSOLUTE: 917 /ΜL
LYMPHOCYTES RELATIVE PERCENT: 19.5 %
MCH RBC QN AUTO: 29.2 PG
MCHC RBC AUTO-ENTMCNC: 33 G/DL
MCV RBC AUTO: 88.5 FL
MONOCYTES ABSOLUTE: 432 /ΜL
MONOCYTES RELATIVE PERCENT: 9.2 %
NEUTROPHILS ABSOLUTE: 3252 /ΜL
NEUTROPHILS RELATIVE PERCENT: 69.2 %
NONHDLC SERPL-MCNC: 129 MG/DL
PDW BLD-RTO: 12.4 %
PLATELET # BLD: 281 K/ΜL
PMV BLD AUTO: 9.1 FL
POTASSIUM SERPL-SCNC: 4.5 MMOL/L
RBC # BLD: 5.3 10^6/ΜL
SODIUM BLD-SCNC: 140 MMOL/L
T4 TOTAL: 8.1
TOTAL PROTEIN: 7.2 G/DL (ref 6.4–8.2)
TRIGL SERPL-MCNC: 79 MG/DL
TSH SERPL DL<=0.05 MIU/L-ACNC: 4.47 UIU/ML
VLDLC SERPL CALC-MCNC: NORMAL MG/DL
WBC # BLD: 4.7 10^3/ML

## 2024-12-17 DIAGNOSIS — R73.01 IMPAIRED FASTING GLUCOSE: ICD-10-CM

## 2024-12-17 DIAGNOSIS — K62.5 RECTAL BLEEDING: ICD-10-CM

## 2024-12-17 DIAGNOSIS — E78.5 HYPERLIPIDEMIA, UNSPECIFIED HYPERLIPIDEMIA TYPE: ICD-10-CM

## 2024-12-19 ENCOUNTER — OFFICE VISIT (OUTPATIENT)
Dept: PRIMARY CARE CLINIC | Age: 79
End: 2024-12-19
Payer: COMMERCIAL

## 2024-12-19 VITALS
TEMPERATURE: 97.7 F | HEIGHT: 64 IN | HEART RATE: 77 BPM | OXYGEN SATURATION: 97 % | WEIGHT: 159 LBS | BODY MASS INDEX: 27.14 KG/M2 | DIASTOLIC BLOOD PRESSURE: 78 MMHG | SYSTOLIC BLOOD PRESSURE: 120 MMHG

## 2024-12-19 DIAGNOSIS — M54.32 SCIATICA OF LEFT SIDE: ICD-10-CM

## 2024-12-19 DIAGNOSIS — E78.5 HYPERLIPIDEMIA, UNSPECIFIED HYPERLIPIDEMIA TYPE: Primary | ICD-10-CM

## 2024-12-19 DIAGNOSIS — R73.01 IMPAIRED FASTING GLUCOSE: ICD-10-CM

## 2024-12-19 DIAGNOSIS — M25.552 PAIN OF LEFT HIP: ICD-10-CM

## 2024-12-19 DIAGNOSIS — M19.90 OSTEOARTHRITIS, UNSPECIFIED OSTEOARTHRITIS TYPE, UNSPECIFIED SITE: ICD-10-CM

## 2024-12-19 PROCEDURE — 99214 OFFICE O/P EST MOD 30 MIN: CPT | Performed by: FAMILY MEDICINE

## 2024-12-19 PROCEDURE — 1159F MED LIST DOCD IN RCRD: CPT | Performed by: FAMILY MEDICINE

## 2024-12-19 PROCEDURE — 1123F ACP DISCUSS/DSCN MKR DOCD: CPT | Performed by: FAMILY MEDICINE

## 2024-12-19 ASSESSMENT — ENCOUNTER SYMPTOMS
ALLERGIC/IMMUNOLOGIC NEGATIVE: 1
RESPIRATORY NEGATIVE: 1
GASTROINTESTINAL NEGATIVE: 1
EYES NEGATIVE: 1

## 2024-12-19 NOTE — PROGRESS NOTES
24     Lina Blackburn    : 1945 Sex: female   Age: 79 y.o.      Chief Complaint   Patient presents with    Fall     Pt states that she fell 4-5 months ago now having hip pain shooting down her left leg.    Leg Pain       Prior to Admission medications    Medication Sig Start Date End Date Taking? Authorizing Provider   pantoprazole (PROTONIX) 40 MG tablet Take 1 tablet by mouth daily 24  Yes Jayson Aguilar, DO   albuterol sulfate HFA (PROAIR HFA) 108 (90 Base) MCG/ACT inhaler Ok substitute 23  Yes Jayson Aguilar DO   Glucosamine HCl 1500 MG TABS  10/27/12  Yes Marisa Rose MD   Omega-3 Fatty Acids (OMEGA-3 FISH OIL) 1000 MG CAPS Take by mouth 2/15/11  Yes Marisa Rose MD   therapeutic multivitamin-minerals (THERAGRAN-M) tablet Take 1 tablet by mouth daily   Yes Marisa Rose MD   calcium carbonate-vitamin D 600-200 MG-UNIT TABS Take  by mouth 2 times daily.   Yes ProviderMarisa MD          HPI: Patient is in today multiple medical problems but primarily complaining of left hip and groin pain today.  We will check x-ray studies of the hip and then have further discussion once available.  Some sciatic discomfort down controlled with Tylenol and recommended 500 to 1000 mg up to 3 times a day if needed.  Reviewed the meds with her today.  Remainder meds continue as prescribed.  Ibuprofen she discontinued.          Review of Systems   Constitutional: Negative.    HENT: Negative.     Eyes: Negative.    Respiratory: Negative.     Gastrointestinal: Negative.    Endocrine: Negative.    Genitourinary: Negative.    Musculoskeletal:  Positive for arthralgias.   Skin: Negative.    Allergic/Immunologic: Negative.    Neurological: Negative.    Hematological: Negative.    Psychiatric/Behavioral: Negative.                Current Outpatient Medications:     pantoprazole (PROTONIX) 40 MG tablet, Take 1 tablet by mouth daily, Disp: 90 tablet, Rfl: 3    albuterol sulfate

## 2025-02-20 DIAGNOSIS — R73.01 IMPAIRED FASTING GLUCOSE: ICD-10-CM

## 2025-02-20 DIAGNOSIS — E78.5 HYPERLIPIDEMIA, UNSPECIFIED HYPERLIPIDEMIA TYPE: ICD-10-CM

## 2025-02-20 LAB
ALBUMIN: 4.6 G/DL
ALP BLD-CCNC: 66 U/L
ALT SERPL-CCNC: 12 U/L
ANION GAP SERPL CALCULATED.3IONS-SCNC: ABNORMAL MMOL/L
AST SERPL-CCNC: 16 U/L
BILIRUB SERPL-MCNC: 0.7 MG/DL (ref 0.1–1.4)
BUN BLDV-MCNC: 14 MG/DL
CALCIUM SERPL-MCNC: 9.6 MG/DL
CHLORIDE BLD-SCNC: 102 MMOL/L
CHOLESTEROL, TOTAL: 201 MG/DL
CHOLESTEROL/HDL RATIO: 3
CO2: 28 MMOL/L
CREAT SERPL-MCNC: 0.72 MG/DL
ESTIMATED AVERAGE GLUCOSE: ABNORMAL
GFR, ESTIMATED: 86
GLUCOSE BLD-MCNC: 123 MG/DL
HBA1C MFR BLD: 6.2 %
HDLC SERPL-MCNC: 66 MG/DL (ref 35–70)
LDL CHOLESTEROL: 113
NONHDLC SERPL-MCNC: 135 MG/DL
POTASSIUM SERPL-SCNC: 4.4 MMOL/L
SODIUM BLD-SCNC: 140 MMOL/L
TOTAL PROTEIN: 7 G/DL (ref 6.4–8.2)
TRIGL SERPL-MCNC: 109 MG/DL
VLDLC SERPL CALC-MCNC: ABNORMAL MG/DL

## 2025-02-24 ENCOUNTER — OFFICE VISIT (OUTPATIENT)
Dept: PRIMARY CARE CLINIC | Age: 80
End: 2025-02-24
Payer: MEDICARE

## 2025-02-24 ENCOUNTER — TELEPHONE (OUTPATIENT)
Dept: PRIMARY CARE CLINIC | Age: 80
End: 2025-02-24

## 2025-02-24 VITALS
DIASTOLIC BLOOD PRESSURE: 60 MMHG | TEMPERATURE: 97.5 F | WEIGHT: 158 LBS | HEART RATE: 77 BPM | OXYGEN SATURATION: 97 % | BODY MASS INDEX: 26.98 KG/M2 | SYSTOLIC BLOOD PRESSURE: 130 MMHG | HEIGHT: 64 IN

## 2025-02-24 VITALS
OXYGEN SATURATION: 97 % | WEIGHT: 158 LBS | SYSTOLIC BLOOD PRESSURE: 130 MMHG | TEMPERATURE: 97.5 F | HEART RATE: 77 BPM | HEIGHT: 64 IN | DIASTOLIC BLOOD PRESSURE: 60 MMHG | BODY MASS INDEX: 26.98 KG/M2

## 2025-02-24 DIAGNOSIS — Z85.3 HX: BREAST CANCER: ICD-10-CM

## 2025-02-24 DIAGNOSIS — R73.01 IMPAIRED FASTING GLUCOSE: Primary | ICD-10-CM

## 2025-02-24 DIAGNOSIS — K29.00 OTHER ACUTE GASTRITIS WITHOUT HEMORRHAGE: ICD-10-CM

## 2025-02-24 DIAGNOSIS — Z23 NEED FOR PROPHYLACTIC VACCINATION AND INOCULATION AGAINST VARICELLA: ICD-10-CM

## 2025-02-24 DIAGNOSIS — Z72.89 OTHER PROBLEMS RELATED TO LIFESTYLE: ICD-10-CM

## 2025-02-24 DIAGNOSIS — Z11.59 NEED FOR HEPATITIS C SCREENING TEST: ICD-10-CM

## 2025-02-24 DIAGNOSIS — E78.5 HYPERLIPIDEMIA, UNSPECIFIED HYPERLIPIDEMIA TYPE: ICD-10-CM

## 2025-02-24 DIAGNOSIS — Z00.00 MEDICARE ANNUAL WELLNESS VISIT, SUBSEQUENT: Primary | ICD-10-CM

## 2025-02-24 PROBLEM — K21.00 GASTROESOPHAGEAL REFLUX DISEASE WITH ESOPHAGITIS: Status: RESOLVED | Noted: 2019-08-21 | Resolved: 2025-02-24

## 2025-02-24 PROCEDURE — 99214 OFFICE O/P EST MOD 30 MIN: CPT | Performed by: FAMILY MEDICINE

## 2025-02-24 PROCEDURE — G0439 PPPS, SUBSEQ VISIT: HCPCS | Performed by: FAMILY MEDICINE

## 2025-02-24 PROCEDURE — 1159F MED LIST DOCD IN RCRD: CPT | Performed by: FAMILY MEDICINE

## 2025-02-24 PROCEDURE — 1123F ACP DISCUSS/DSCN MKR DOCD: CPT | Performed by: FAMILY MEDICINE

## 2025-02-24 RX ORDER — METFORMIN HYDROCHLORIDE 500 MG/1
500 TABLET, EXTENDED RELEASE ORAL
Qty: 30 TABLET | Refills: 2 | Status: SHIPPED | OUTPATIENT
Start: 2025-02-24

## 2025-02-24 RX ORDER — RESPIRATORY SYNCYTIAL VISUS VACCINE RECOMBINANT, ADJUVANTED 120MCG/0.5
0.5 KIT INTRAMUSCULAR ONCE
Qty: 0.5 ML | Refills: 0 | Status: SHIPPED | OUTPATIENT
Start: 2025-02-24 | End: 2025-02-24

## 2025-02-24 RX ORDER — ALBUTEROL SULFATE 90 UG/1
INHALANT RESPIRATORY (INHALATION)
Qty: 1 EACH | Refills: 3 | Status: SHIPPED | OUTPATIENT
Start: 2025-02-24

## 2025-02-24 RX ORDER — PANTOPRAZOLE SODIUM 40 MG/1
40 TABLET, DELAYED RELEASE ORAL DAILY
Qty: 90 TABLET | Refills: 3 | Status: SHIPPED | OUTPATIENT
Start: 2025-02-24

## 2025-02-24 RX ORDER — IBUPROFEN 800 MG/1
TABLET, FILM COATED ORAL
Qty: 60 TABLET | Refills: 0 | Status: SHIPPED | OUTPATIENT
Start: 2025-02-24

## 2025-02-24 RX ORDER — ALBUTEROL SULFATE 90 UG/1
INHALANT RESPIRATORY (INHALATION)
Qty: 1 EACH | Refills: 3 | Status: SHIPPED
Start: 2025-02-24 | End: 2025-02-24 | Stop reason: SDUPTHER

## 2025-02-24 SDOH — ECONOMIC STABILITY: FOOD INSECURITY: WITHIN THE PAST 12 MONTHS, THE FOOD YOU BOUGHT JUST DIDN'T LAST AND YOU DIDN'T HAVE MONEY TO GET MORE.: NEVER TRUE

## 2025-02-24 SDOH — ECONOMIC STABILITY: FOOD INSECURITY: WITHIN THE PAST 12 MONTHS, YOU WORRIED THAT YOUR FOOD WOULD RUN OUT BEFORE YOU GOT MONEY TO BUY MORE.: NEVER TRUE

## 2025-02-24 ASSESSMENT — PATIENT HEALTH QUESTIONNAIRE - PHQ9
2. FEELING DOWN, DEPRESSED OR HOPELESS: NOT AT ALL
2. FEELING DOWN, DEPRESSED OR HOPELESS: NOT AT ALL
1. LITTLE INTEREST OR PLEASURE IN DOING THINGS: NOT AT ALL
1. LITTLE INTEREST OR PLEASURE IN DOING THINGS: NOT AT ALL
SUM OF ALL RESPONSES TO PHQ QUESTIONS 1-9: 0
SUM OF ALL RESPONSES TO PHQ9 QUESTIONS 1 & 2: 0
SUM OF ALL RESPONSES TO PHQ9 QUESTIONS 1 & 2: 0

## 2025-02-24 ASSESSMENT — LIFESTYLE VARIABLES
HOW MANY STANDARD DRINKS CONTAINING ALCOHOL DO YOU HAVE ON A TYPICAL DAY: PATIENT DOES NOT DRINK
HOW OFTEN DO YOU HAVE A DRINK CONTAINING ALCOHOL: NEVER

## 2025-02-24 NOTE — PROGRESS NOTES
Medicare Annual Wellness Visit    Lina Blackburn is here for Medicare AWV    Assessment & Plan   Medicare annual wellness visit, subsequent  -     zoster recombinant adjuvanted vaccine (SHINGRIX) 50 MCG/0.5ML SUSR injection; Inject 0.5 mLs into the muscle once for 1 dose, Disp-0.5 mL, R-0Print  -     respiratory syncytial vaccine, adjuvanted (AREXVY) 120 MCG/0.5ML injection; Inject 0.5 mLs into the muscle once for 1 dose, Disp-0.5 mL, R-0Print  -     Hepatitis C Antibody; Future  Need for hepatitis C screening test  -     Hepatitis C Antibody; Future  Need for prophylactic vaccination and inoculation against varicella  -     zoster recombinant adjuvanted vaccine (SHINGRIX) 50 MCG/0.5ML SUSR injection; Inject 0.5 mLs into the muscle once for 1 dose, Disp-0.5 mL, R-0Print  Other problems related to lifestyle  -     Hepatitis C Antibody; Future       Return for Medicare Annual Wellness Visit in 1 year.     Subjective       Patient's complete Health Risk Assessment and screening values have been reviewed and are found in Flowsheets. The following problems were reviewed today and where indicated follow up appointments were made and/or referrals ordered.    Positive Risk Factor Screenings with Interventions:              Inactivity:  On average, how many days per week do you engage in moderate to strenuous exercise (like a brisk walk)?: 0 days (!) Abnormal  On average, how many minutes do you engage in exercise at this level?: 0 min  Interventions:  As tolerated                         Objective   Vitals:    02/24/25 1320   BP: 130/60   Pulse: 77   Temp: 97.5 °F (36.4 °C)   SpO2: 97%   Weight: 71.7 kg (158 lb)   Height: 1.626 m (5' 4\")      Body mass index is 27.12 kg/m².                No Known Allergies  Prior to Visit Medications    Medication Sig Taking? Authorizing Provider   zoster recombinant adjuvanted vaccine (SHINGRIX) 50 MCG/0.5ML SUSR injection Inject 0.5 mLs into the muscle once for 1 dose Yes Jeff

## 2025-02-24 NOTE — PROGRESS NOTES
25     Lina Blackburn    : 1945 Sex: female   Age: 79 y.o.      Chief Complaint   Patient presents with    Discuss Medications    Hyperlipidemia       Prior to Admission medications    Medication Sig Start Date End Date Taking? Authorizing Provider   pantoprazole (PROTONIX) 40 MG tablet Take 1 tablet by mouth daily 25  Yes Jayson Aguilar, DO   albuterol sulfate HFA (PROAIR HFA) 108 (90 Base) MCG/ACT inhaler Ok substitute 25  Yes Jayson Aguilar DO   ibuprofen (ADVIL;MOTRIN) 800 MG tablet 1 q12 prn 25  Yes Jayson Aguilar DO   Glucosamine HCl 1500 MG TABS  10/27/12  Yes Marisa Rose MD   Omega-3 Fatty Acids (OMEGA-3 FISH OIL) 1000 MG CAPS Take by mouth 2/15/11  Yes Marisa Rose MD   therapeutic multivitamin-minerals (THERAGRAN-M) tablet Take 1 tablet by mouth daily   Yes Marisa Rose MD   calcium carbonate-vitamin D 600-200 MG-UNIT TABS Take  by mouth 2 times daily.   Yes Marisa Rose MD          HPI: Patient in today with history of impaired fasting glucose hyperlipidemia history of breast cancer and history of gastritis in the past.  Currently she is well-controlled.  She has been on Protonix which we will maintain over the next 3 months but then if she remains asymptomatic from a reflux standpoint I would recommend backing off and trying Pepcid as an alternative medication.  Prediabetes is present and she is agreeable to trying metformin which I initiated today 500 mg once a day basis and reassess with next visit.  Breast cancer history has been stable.          Review of Systems   Constitutional: Negative.    HENT: Negative.     Eyes: Negative.    Respiratory: Negative.     Gastrointestinal: Negative.    Endocrine: Negative.    Genitourinary: Negative.    Musculoskeletal: Negative.    Skin: Negative.    Allergic/Immunologic: Negative.    Neurological: Negative.    Hematological: Negative.    Psychiatric/Behavioral: Negative.

## 2025-02-24 NOTE — PATIENT INSTRUCTIONS
active is to take it slow and steady. Try to improve only a little bit at a time. Pick just one area to improve on at first. And if an activity hurts, stop and talk to your doctor.  Where can you learn more?  Go to https://www.[employer].net/patientEd and enter P600 to learn more about \"Learning About Being Active as an Older Adult.\"  Current as of: July 31, 2024  Content Version: 14.3  © 2024 [employer].   Care instructions adapted under license by [employer]. If you have questions about a medical condition or this instruction, always ask your healthcare professional. [employer], disclaims any warranty or liability for your use of this information.         A Healthy Heart: Care Instructions  Overview     Coronary artery disease, also called heart disease, occurs when a substance called plaque builds up in the vessels that supply oxygen-rich blood to your heart muscle. This can narrow the blood vessels and reduce blood flow. A heart attack happens when blood flow is completely blocked. A high-fat diet, smoking, and other factors increase the risk of heart disease.  Your doctor has found that you have a chance of having heart disease. A heart-healthy lifestyle can help keep your heart healthy and prevent heart disease. This lifestyle includes eating healthy, being active, staying at a weight that's healthy for you, and not smoking or using tobacco. It also includes taking medicines as directed, managing other health conditions, and trying to get a healthy amount of sleep.  Follow-up care is a key part of your treatment and safety. Be sure to make and go to all appointments, and call your doctor if you are having problems. It's also a good idea to know your test results and keep a list of the medicines you take.  How can you care for yourself at home?  Diet    Use less salt when you cook and eat. This helps lower your blood pressure. Taste food before salting. Add only a little salt when you

## 2025-03-27 RX ORDER — METFORMIN HYDROCHLORIDE 500 MG/1
500 TABLET, EXTENDED RELEASE ORAL DAILY
Qty: 90 TABLET | Refills: 3 | Status: SHIPPED | OUTPATIENT
Start: 2025-03-27

## 2025-04-08 LAB — ANTIBODY: NORMAL

## 2025-04-09 DIAGNOSIS — Z00.00 MEDICARE ANNUAL WELLNESS VISIT, SUBSEQUENT: ICD-10-CM

## 2025-04-09 DIAGNOSIS — Z11.59 NEED FOR HEPATITIS C SCREENING TEST: ICD-10-CM

## 2025-04-09 DIAGNOSIS — Z72.89 OTHER PROBLEMS RELATED TO LIFESTYLE: ICD-10-CM

## 2025-04-10 ENCOUNTER — OFFICE VISIT (OUTPATIENT)
Dept: PRIMARY CARE CLINIC | Age: 80
End: 2025-04-10
Payer: MEDICARE

## 2025-04-10 VITALS
WEIGHT: 155 LBS | SYSTOLIC BLOOD PRESSURE: 126 MMHG | HEIGHT: 64 IN | DIASTOLIC BLOOD PRESSURE: 60 MMHG | OXYGEN SATURATION: 96 % | TEMPERATURE: 97.3 F | HEART RATE: 70 BPM | BODY MASS INDEX: 26.46 KG/M2

## 2025-04-10 DIAGNOSIS — R73.01 IMPAIRED FASTING GLUCOSE: ICD-10-CM

## 2025-04-10 DIAGNOSIS — E78.5 HYPERLIPIDEMIA, UNSPECIFIED HYPERLIPIDEMIA TYPE: Primary | ICD-10-CM

## 2025-04-10 DIAGNOSIS — Z85.3 HX: BREAST CANCER: ICD-10-CM

## 2025-04-10 DIAGNOSIS — R30.0 DYSURIA: ICD-10-CM

## 2025-04-10 LAB
BILIRUBIN, POC: ABNORMAL
BLOOD URINE, POC: ABNORMAL
CLARITY, POC: CLEAR
COLOR, POC: YELLOW
GLUCOSE URINE, POC: ABNORMAL MG/DL
KETONES, POC: ABNORMAL MG/DL
LEUKOCYTE EST, POC: ABNORMAL
NITRITE, POC: ABNORMAL
PH, POC: 6
PROTEIN, POC: ABNORMAL MG/DL
SPECIFIC GRAVITY, POC: 1.01
UROBILINOGEN, POC: 0.2 MG/DL

## 2025-04-10 PROCEDURE — 99214 OFFICE O/P EST MOD 30 MIN: CPT | Performed by: FAMILY MEDICINE

## 2025-04-10 PROCEDURE — 1159F MED LIST DOCD IN RCRD: CPT | Performed by: FAMILY MEDICINE

## 2025-04-10 PROCEDURE — 81002 URINALYSIS NONAUTO W/O SCOPE: CPT | Performed by: FAMILY MEDICINE

## 2025-04-10 PROCEDURE — 1123F ACP DISCUSS/DSCN MKR DOCD: CPT | Performed by: FAMILY MEDICINE

## 2025-04-10 RX ORDER — SULFAMETHOXAZOLE AND TRIMETHOPRIM 800; 160 MG/1; MG/1
1 TABLET ORAL 2 TIMES DAILY
Qty: 14 TABLET | Refills: 0 | Status: SHIPPED | OUTPATIENT
Start: 2025-04-10 | End: 2025-04-17

## 2025-04-10 RX ORDER — MAGNESIUM 200 MG
200 TABLET ORAL DAILY
Qty: 30 TABLET | Refills: 3 | Status: SHIPPED | OUTPATIENT
Start: 2025-04-10

## 2025-04-10 RX ORDER — METFORMIN HYDROCHLORIDE 500 MG/1
500 TABLET, EXTENDED RELEASE ORAL DAILY
Qty: 90 TABLET | Refills: 3 | Status: SHIPPED | OUTPATIENT
Start: 2025-04-10

## 2025-04-10 ASSESSMENT — ENCOUNTER SYMPTOMS
ALLERGIC/IMMUNOLOGIC NEGATIVE: 1
EYES NEGATIVE: 1
RESPIRATORY NEGATIVE: 1
GASTROINTESTINAL NEGATIVE: 1

## 2025-04-10 NOTE — PROGRESS NOTES
Negative.    Allergic/Immunologic: Negative.    Neurological: Negative.    Hematological: Negative.    Psychiatric/Behavioral: Negative.        Today systems review stable meds as prescribed.  Labs reviewed and they are stable.  A1c at 6.2.        Current Outpatient Medications:     metFORMIN (GLUCOPHAGE-XR) 500 MG extended release tablet, Take 1 tablet by mouth daily, Disp: 90 tablet, Rfl: 3    magnesium 200 MG TABS tablet, Take 1 tablet by mouth daily, Disp: 30 tablet, Rfl: 3    pantoprazole (PROTONIX) 40 MG tablet, Take 1 tablet by mouth daily, Disp: 90 tablet, Rfl: 3    ibuprofen (ADVIL;MOTRIN) 800 MG tablet, 1 q12 prn, Disp: 60 tablet, Rfl: 0    albuterol sulfate HFA (PROAIR HFA) 108 (90 Base) MCG/ACT inhaler, 2 puffs 4 times daily as needed., Disp: 1 each, Rfl: 3    Glucosamine HCl 1500 MG TABS, , Disp: , Rfl:     Omega-3 Fatty Acids (OMEGA-3 FISH OIL) 1000 MG CAPS, Take by mouth, Disp: , Rfl:     therapeutic multivitamin-minerals (THERAGRAN-M) tablet, Take 1 tablet by mouth daily, Disp: , Rfl:     calcium carbonate-vitamin D 600-200 MG-UNIT TABS, Take  by mouth 2 times daily., Disp: , Rfl:     No Known Allergies    Social History     Tobacco Use    Smoking status: Former     Current packs/day: 0.00     Average packs/day: 1 pack/day for 10.0 years (10.0 ttl pk-yrs)     Types: Cigarettes     Start date: 1992     Quit date: 2002     Years since quittin.2    Smokeless tobacco: Never   Substance Use Topics    Drug use: No      Past Surgical History:   Procedure Laterality Date    BACK SURGERY      [facility]    COL[facility]COPY N/A 3/8/2019    COLONOSCOPY DIAGNOSTIC performed by Nick Marsh MD at [facility]    HYSTERECTOMY (CERVIX STATUS UNKNOWN)      LYMPH NODE DISSECTION      Dr. Gonzalez [facility]    UPPER GASTROINTESTINAL ENDOSCOPY N/A 3/8/2019    EGD BIOPSY performed by Nick Marsh MD at [facility] END[facility]

## 2025-04-12 LAB
CULTURE: ABNORMAL
SPECIMEN DESCRIPTION: ABNORMAL

## 2025-04-14 ENCOUNTER — TELEPHONE (OUTPATIENT)
Dept: PRIMARY CARE CLINIC | Age: 80
End: 2025-04-14

## 2025-04-14 NOTE — TELEPHONE ENCOUNTER
Pt given Bactrum for a UTI and had bad reaction, trouble breathing and body turned red, heart racing, she did nothing for it, didn't go to the ED  or take anything, waited for it to improve, so now she's not on anything for her UTI so she's asking if you would give her something else for the UTI, or would you prefer to see her again?

## 2025-05-08 ENCOUNTER — OFFICE VISIT (OUTPATIENT)
Dept: PRIMARY CARE CLINIC | Age: 80
End: 2025-05-08
Payer: MEDICARE

## 2025-05-08 VITALS
HEART RATE: 80 BPM | OXYGEN SATURATION: 96 % | TEMPERATURE: 98 F | DIASTOLIC BLOOD PRESSURE: 70 MMHG | SYSTOLIC BLOOD PRESSURE: 130 MMHG | WEIGHT: 158 LBS | HEIGHT: 64 IN | BODY MASS INDEX: 26.98 KG/M2

## 2025-05-08 DIAGNOSIS — M15.0 PRIMARY OSTEOARTHRITIS INVOLVING MULTIPLE JOINTS: ICD-10-CM

## 2025-05-08 DIAGNOSIS — E78.5 HYPERLIPIDEMIA, UNSPECIFIED HYPERLIPIDEMIA TYPE: Primary | ICD-10-CM

## 2025-05-08 DIAGNOSIS — Z17.0 MALIGNANT NEOPLASM OF LEFT BREAST IN FEMALE, ESTROGEN RECEPTOR POSITIVE, UNSPECIFIED SITE OF BREAST (HCC): ICD-10-CM

## 2025-05-08 DIAGNOSIS — R73.01 IMPAIRED FASTING GLUCOSE: ICD-10-CM

## 2025-05-08 DIAGNOSIS — Z85.3 HX: BREAST CANCER: ICD-10-CM

## 2025-05-08 DIAGNOSIS — C50.912 MALIGNANT NEOPLASM OF LEFT BREAST IN FEMALE, ESTROGEN RECEPTOR POSITIVE, UNSPECIFIED SITE OF BREAST (HCC): ICD-10-CM

## 2025-05-08 DIAGNOSIS — K21.9 GASTROESOPHAGEAL REFLUX DISEASE WITHOUT ESOPHAGITIS: ICD-10-CM

## 2025-05-08 PROCEDURE — 1159F MED LIST DOCD IN RCRD: CPT | Performed by: FAMILY MEDICINE

## 2025-05-08 PROCEDURE — 99214 OFFICE O/P EST MOD 30 MIN: CPT | Performed by: FAMILY MEDICINE

## 2025-05-08 PROCEDURE — 1123F ACP DISCUSS/DSCN MKR DOCD: CPT | Performed by: FAMILY MEDICINE

## 2025-05-08 NOTE — PROGRESS NOTES
25     Lina Blackburn    : 1945 Sex: female   Age: 79 y.o.      Chief Complaint   Patient presents with    Hyperlipidemia    Discuss Medications       Prior to Admission medications    Medication Sig Start Date End Date Taking? Authorizing Provider   cephALEXin (KEFLEX) 250 MG capsule Take 1 capsule by mouth 2 times daily 25   Jayson Aguilar DO   metFORMIN (GLUCOPHAGE-XR) 500 MG extended release tablet Take 1 tablet by mouth daily 4/10/25   Jayson Aguilar DO   magnesium 200 MG TABS tablet Take 1 tablet by mouth daily 4/10/25   Jayson Aguilar DO   pantoprazole (PROTONIX) 40 MG tablet Take 1 tablet by mouth daily 25   Jayson Aguilar DO   ibuprofen (ADVIL;MOTRIN) 800 MG tablet 1 q12 prn 25   Jayson Aguilar DO   albuterol sulfate HFA (PROAIR HFA) 108 (90 Base) MCG/ACT inhaler 2 puffs 4 times daily as needed. 25   Jayson Aguilar DO   Glucosamine HCl 1500 MG TABS  10/27/12   Marisa Rose MD   Omega-3 Fatty Acids (OMEGA-3 FISH OIL) 1000 MG CAPS Take by mouth 2/15/11   Mraisa Rose MD   therapeutic multivitamin-minerals (THERAGRAN-M) tablet Take 1 tablet by mouth daily    Marisa Rose MD   calcium carbonate-vitamin D 600-200 MG-UNIT TABS Take  by mouth 2 times daily.    Marisa Rose MD          HPI: Patient seen today hyperlipidemia impaired fasting glucose history of breast cancer reflux disease all currently stable.  She did have a recent adverse reaction to Bactrim so I encouraged her no further use of sulfa in the future.  Urinary symptoms have all resolved.          Review of Systems   Constitutional: Negative.    HENT: Negative.     Eyes: Negative.    Respiratory: Negative.     Gastrointestinal: Negative.    Endocrine: Negative.    Genitourinary: Negative.    Musculoskeletal: Negative.    Skin: Negative.    Allergic/Immunologic: Negative.    Neurological: Negative.    Hematological: Negative.    Psychiatric/Behavioral:

## 2025-06-11 ENCOUNTER — HOSPITAL ENCOUNTER (OUTPATIENT)
Dept: GENERAL RADIOLOGY | Age: 80
Discharge: HOME | End: 2025-06-13
Payer: MEDICARE

## 2025-06-11 VITALS — WEIGHT: 153 LBS | BODY MASS INDEX: 26.26 KG/M2

## 2025-06-11 DIAGNOSIS — Z12.31 VISIT FOR SCREENING MAMMOGRAM: ICD-10-CM

## 2025-06-11 PROCEDURE — 77063 BREAST TOMOSYNTHESIS BI: CPT

## 2025-08-06 LAB
ALBUMIN: 1.9 G/DL
ALP BLD-CCNC: 55 U/L
ALT SERPL-CCNC: 9 U/L
ANION GAP SERPL CALCULATED.3IONS-SCNC: NORMAL MMOL/L
AST SERPL-CCNC: 16 U/L
BASOPHILS ABSOLUTE: 22 /ΜL
BASOPHILS RELATIVE PERCENT: 0.5 %
BILIRUB SERPL-MCNC: 0.6 MG/DL (ref 0.1–1.4)
BUN BLDV-MCNC: NORMAL MG/DL
CALCIUM SERPL-MCNC: 9.6 MG/DL
CHLORIDE BLD-SCNC: 102 MMOL/L
CHOLESTEROL, TOTAL: 182 MG/DL
CHOLESTEROL/HDL RATIO: 2.8
CO2: 28 MMOL/L
CREAT SERPL-MCNC: 0.66 MG/DL
EOSINOPHILS ABSOLUTE: 142 /ΜL
EOSINOPHILS RELATIVE PERCENT: 3.3 %
ESTIMATED AVERAGE GLUCOSE: NORMAL
GFR, ESTIMATED: 90
GLUCOSE BLD-MCNC: 114 MG/DL
HBA1C MFR BLD: 6 %
HCT VFR BLD CALC: 46.1 % (ref 36–46)
HDLC SERPL-MCNC: 64 MG/DL (ref 35–70)
HEMOGLOBIN: 15.1 G/DL (ref 12–16)
LDL CHOLESTEROL: 102
LYMPHOCYTES ABSOLUTE: 1152 /ΜL
LYMPHOCYTES RELATIVE PERCENT: 26.8 %
MAGNESIUM: 1.9 MG/DL
MCH RBC QN AUTO: 29.7 PG
MCHC RBC AUTO-ENTMCNC: 32.8 G/DL
MCV RBC AUTO: 90.7 FL
MONOCYTES ABSOLUTE: 396 /ΜL
MONOCYTES RELATIVE PERCENT: 9.2 %
NEUTROPHILS ABSOLUTE: 2589 /ΜL
NEUTROPHILS RELATIVE PERCENT: 60.2 %
NONHDLC SERPL-MCNC: 118 MG/DL
PDW BLD-RTO: 13 %
PLATELET # BLD: 277 K/ΜL
PMV BLD AUTO: 8.8 FL
POTASSIUM SERPL-SCNC: 4.3 MMOL/L
RBC # BLD: 5.08 10^6/ΜL
SODIUM BLD-SCNC: 139 MMOL/L
T4 TOTAL: 8.6
TOTAL PROTEIN: 7.2 G/DL (ref 6.4–8.2)
TRIGL SERPL-MCNC: 70 MG/DL
TSH SERPL DL<=0.05 MIU/L-ACNC: 3.14 UIU/ML
VLDLC SERPL CALC-MCNC: NORMAL MG/DL
WBC # BLD: 4.3 10^3/ML

## 2025-08-07 ENCOUNTER — OFFICE VISIT (OUTPATIENT)
Dept: PRIMARY CARE CLINIC | Age: 80
End: 2025-08-07
Payer: MEDICARE

## 2025-08-07 VITALS
HEART RATE: 93 BPM | SYSTOLIC BLOOD PRESSURE: 110 MMHG | DIASTOLIC BLOOD PRESSURE: 70 MMHG | WEIGHT: 150 LBS | TEMPERATURE: 97.4 F | RESPIRATION RATE: 16 BRPM | BODY MASS INDEX: 25.61 KG/M2 | OXYGEN SATURATION: 96 % | HEIGHT: 64 IN

## 2025-08-07 DIAGNOSIS — R73.01 IMPAIRED FASTING GLUCOSE: ICD-10-CM

## 2025-08-07 DIAGNOSIS — M15.0 PRIMARY OSTEOARTHRITIS INVOLVING MULTIPLE JOINTS: ICD-10-CM

## 2025-08-07 DIAGNOSIS — C50.912 MALIGNANT NEOPLASM OF LEFT BREAST IN FEMALE, ESTROGEN RECEPTOR POSITIVE, UNSPECIFIED SITE OF BREAST (HCC): ICD-10-CM

## 2025-08-07 DIAGNOSIS — Z17.0 MALIGNANT NEOPLASM OF LEFT BREAST IN FEMALE, ESTROGEN RECEPTOR POSITIVE, UNSPECIFIED SITE OF BREAST (HCC): ICD-10-CM

## 2025-08-07 DIAGNOSIS — E78.5 HYPERLIPIDEMIA, UNSPECIFIED HYPERLIPIDEMIA TYPE: ICD-10-CM

## 2025-08-07 DIAGNOSIS — R73.01 IMPAIRED FASTING GLUCOSE: Primary | ICD-10-CM

## 2025-08-07 DIAGNOSIS — M54.32 SCIATICA OF LEFT SIDE: ICD-10-CM

## 2025-08-07 DIAGNOSIS — M25.552 PAIN OF LEFT HIP: ICD-10-CM

## 2025-08-07 PROCEDURE — 1159F MED LIST DOCD IN RCRD: CPT | Performed by: FAMILY MEDICINE

## 2025-08-07 PROCEDURE — 1123F ACP DISCUSS/DSCN MKR DOCD: CPT | Performed by: FAMILY MEDICINE

## 2025-08-07 PROCEDURE — 99214 OFFICE O/P EST MOD 30 MIN: CPT | Performed by: FAMILY MEDICINE

## 2025-08-07 ASSESSMENT — ENCOUNTER SYMPTOMS
RESPIRATORY NEGATIVE: 1
EYES NEGATIVE: 1
ALLERGIC/IMMUNOLOGIC NEGATIVE: 1
GASTROINTESTINAL NEGATIVE: 1

## (undated) DEVICE — FORCEPS BX L160CM JAW DIA2.4MM YEL L CAP W/ NDL DISP RAD

## (undated) DEVICE — GAUZE,SPONGE,POST-OP,4X3,STRL,LF: Brand: MEDLINE

## (undated) DEVICE — BLOCK BITE 60FR RUBBER ADLT DENTAL

## (undated) DEVICE — CANNULA NSL ORAL AD FOR CAPNOFLEX CO2 O2 AIRLFE

## (undated) DEVICE — DEFENDO AIR WATER SUCTION AND BIOPSY VALVE KIT FOR  OLYMPUS: Brand: DEFENDO AIR/WATER/SUCTION AND BIOPSY VALVE